# Patient Record
Sex: FEMALE | Race: WHITE | Employment: UNEMPLOYED | URBAN - METROPOLITAN AREA
[De-identification: names, ages, dates, MRNs, and addresses within clinical notes are randomized per-mention and may not be internally consistent; named-entity substitution may affect disease eponyms.]

---

## 2017-12-07 ENCOUNTER — OFFICE VISIT (OUTPATIENT)
Dept: FAMILY MEDICINE CLINIC | Facility: CLINIC | Age: 42
End: 2017-12-07

## 2017-12-07 DIAGNOSIS — Z23 NEED FOR VACCINATION: ICD-10-CM

## 2017-12-07 PROCEDURE — 90686 IIV4 VACC NO PRSV 0.5 ML IM: CPT | Performed by: NURSE PRACTITIONER

## 2017-12-07 PROCEDURE — 90471 IMMUNIZATION ADMIN: CPT | Performed by: NURSE PRACTITIONER

## 2017-12-27 PROBLEM — N80.9 ENDOMETRIOSIS: Status: ACTIVE | Noted: 2017-12-27

## 2017-12-27 PROBLEM — Z98.890 S/P ENDOMETRIAL ABLATION: Status: ACTIVE | Noted: 2017-12-27

## 2017-12-27 PROCEDURE — 87624 HPV HI-RISK TYP POOLED RSLT: CPT | Performed by: OBSTETRICS & GYNECOLOGY

## 2017-12-27 PROCEDURE — 88175 CYTOPATH C/V AUTO FLUID REDO: CPT | Performed by: OBSTETRICS & GYNECOLOGY

## 2018-02-13 ENCOUNTER — HOSPITAL ENCOUNTER (OUTPATIENT)
Dept: MAMMOGRAPHY | Age: 43
Discharge: HOME | End: 2018-02-13
Attending: OBSTETRICS & GYNECOLOGY
Payer: COMMERCIAL

## 2018-02-13 DIAGNOSIS — Z12.31 ENCOUNTER FOR SCREENING MAMMOGRAM FOR MALIGNANT NEOPLASM OF BREAST: ICD-10-CM

## 2018-02-13 PROCEDURE — 77067 SCR MAMMO BI INCL CAD: CPT | Performed by: OBSTETRICS & GYNECOLOGY

## 2018-02-13 PROCEDURE — 77063 BREAST TOMOSYNTHESIS BI: CPT | Performed by: OBSTETRICS & GYNECOLOGY

## 2018-02-23 ENCOUNTER — HOSPITAL ENCOUNTER (OUTPATIENT)
Dept: MAMMOGRAPHY | Age: 43
Discharge: HOME | End: 2018-02-23
Attending: OBSTETRICS & GYNECOLOGY
Payer: COMMERCIAL

## 2018-02-23 DIAGNOSIS — R92.2 INCONCLUSIVE MAMMOGRAM: ICD-10-CM

## 2018-02-23 PROCEDURE — 77065 DX MAMMO INCL CAD UNI: CPT | Performed by: OBSTETRICS & GYNECOLOGY

## 2018-02-23 NOTE — PROGRESS NOTES
Radiology has informed patient of results and recommendations for follow up. IF PATIENT HAD FILMS AT [facility], DO NOT NEED TO CONTACT: THEY HAVE SPOKEN TO HER.  IF DONE AT ANY OTHER FACILITY, PLEASE CONTACT TO BE SURE RECEIVED RESULTS AND F/U INSTRUCTIONS  Sent

## 2018-06-14 ENCOUNTER — HOSPITAL ENCOUNTER (OUTPATIENT)
Dept: GENERAL RADIOLOGY | Age: 43
Discharge: HOME | End: 2018-06-14
Attending: PHYSICIAN ASSISTANT
Payer: COMMERCIAL

## 2018-06-14 ENCOUNTER — OFFICE VISIT (OUTPATIENT)
Dept: FAMILY MEDICINE CLINIC | Facility: CLINIC | Age: 43
End: 2018-06-14

## 2018-06-14 VITALS
SYSTOLIC BLOOD PRESSURE: 114 MMHG | WEIGHT: 158 LBS | TEMPERATURE: 99 F | OXYGEN SATURATION: 98 % | BODY MASS INDEX: 29.83 KG/M2 | DIASTOLIC BLOOD PRESSURE: 80 MMHG | RESPIRATION RATE: 16 BRPM | HEIGHT: 61 IN | HEART RATE: 85 BPM

## 2018-06-14 DIAGNOSIS — M25.562 ACUTE PAIN OF LEFT KNEE: Primary | ICD-10-CM

## 2018-06-14 DIAGNOSIS — M25.562 ACUTE PAIN OF LEFT KNEE: ICD-10-CM

## 2018-06-14 PROCEDURE — 99204 OFFICE O/P NEW MOD 45 MIN: CPT | Performed by: PHYSICIAN ASSISTANT

## 2018-06-14 PROCEDURE — 73560 X-RAY EXAM OF KNEE 1 OR 2: CPT | Performed by: PHYSICIAN ASSISTANT

## 2018-06-14 NOTE — PROGRESS NOTES
Patient presents with:  Knee Pain: left knee, started 2 days ago    HPI:     Adan Leger is a 36year old female who presents today with a chief complaint of  left knee pain. She was playing with kids in the yard.  She was running and slipped on wet gras XR KNEE (1 OR 2 VIEWS), LEFT (CPT=73560); Future  -     Diclofenac Sodium 50 MG Oral Tab EC;  Take 1 tablet (50 mg total) by mouth 2 (two) times daily.  - Hinged patella knee brace   - Rest   - MRI if symptoms persist/based on xray

## 2018-06-15 ENCOUNTER — TELEPHONE (OUTPATIENT)
Dept: FAMILY MEDICINE CLINIC | Facility: CLINIC | Age: 43
End: 2018-06-15

## 2018-06-15 NOTE — TELEPHONE ENCOUNTER
----- Message from Natalya Hightower PA-C sent at 6/14/2018  5:00 PM CDT -----  Normal xray.  MRI ordered to further evaluate-ok to have patient schedule

## 2018-06-15 NOTE — TELEPHONE ENCOUNTER
PSR-please let patient know that her knee X-ray is normal so Maddie Coles has ordered an MRI for her. Central scheduling [phone]. Thanks.  left for patient to call back.

## 2018-07-16 ENCOUNTER — HOSPITAL ENCOUNTER (OUTPATIENT)
Dept: MRI IMAGING | Age: 43
Discharge: HOME | End: 2018-07-16
Attending: PHYSICIAN ASSISTANT
Payer: COMMERCIAL

## 2018-07-16 DIAGNOSIS — M25.562 ACUTE PAIN OF LEFT KNEE: ICD-10-CM

## 2018-07-16 PROCEDURE — 73721 MRI JNT OF LWR EXTRE W/O DYE: CPT | Performed by: PHYSICIAN ASSISTANT

## 2018-07-25 ENCOUNTER — TELEPHONE (OUTPATIENT)
Dept: FAMILY MEDICINE CLINIC | Facility: CLINIC | Age: 43
End: 2018-07-25

## 2018-07-25 NOTE — TELEPHONE ENCOUNTER
I called pt at [phone] and verified 2 patient identifiers: name & . I discussed results and recommendations at length with patient. All questions answered.

## 2018-07-25 NOTE — TELEPHONE ENCOUNTER
----- Message from Mariposa Conklin MD sent at 7/24/2018  1:17 PM CDT -----  No evidence to meniscal tear or ACL rupture    +ACL sprain  Pls advise rest elevation  Use crutches as needed  May continue with knee brace  Follow up in 3-4 weeks for recheck if

## 2018-10-16 ENCOUNTER — TELEPHONE (OUTPATIENT)
Dept: FAMILY MEDICINE CLINIC | Facility: CLINIC | Age: 43
End: 2018-10-16

## 2018-10-16 RX ORDER — VALACYCLOVIR HYDROCHLORIDE 1 G/1
2 TABLET, FILM COATED ORAL 2 TIMES DAILY
Qty: 4 TABLET | Refills: 0 | Status: SHIPPED | OUTPATIENT
Start: 2018-10-16 | End: 2018-10-17

## 2018-10-16 NOTE — TELEPHONE ENCOUNTER
Patient Radha Mirza called to request medication for coldsore. She has tried abreva and lysine and these are not clearing up the coldsore. States that she is unable to come in to office today as her daughter is having surgery today.   Requesting that script f

## 2018-11-02 ENCOUNTER — IMMUNIZATION (OUTPATIENT)
Dept: FAMILY MEDICINE CLINIC | Facility: CLINIC | Age: 43
End: 2018-11-02
Payer: COMMERCIAL

## 2018-11-02 DIAGNOSIS — Z23 NEED FOR VACCINATION: ICD-10-CM

## 2018-11-02 PROCEDURE — 90686 IIV4 VACC NO PRSV 0.5 ML IM: CPT | Performed by: PHYSICIAN ASSISTANT

## 2018-11-02 PROCEDURE — 90471 IMMUNIZATION ADMIN: CPT | Performed by: PHYSICIAN ASSISTANT

## 2018-11-05 ENCOUNTER — MED REC SCAN ONLY (OUTPATIENT)
Dept: FAMILY MEDICINE CLINIC | Facility: CLINIC | Age: 43
End: 2018-11-05

## 2019-05-30 ENCOUNTER — TELEPHONE (OUTPATIENT)
Dept: FAMILY MEDICINE CLINIC | Facility: CLINIC | Age: 44
End: 2019-05-30

## 2019-05-30 ENCOUNTER — OFFICE VISIT (OUTPATIENT)
Dept: FAMILY MEDICINE CLINIC | Facility: CLINIC | Age: 44
End: 2019-05-30
Payer: COMMERCIAL

## 2019-05-30 VITALS
DIASTOLIC BLOOD PRESSURE: 70 MMHG | OXYGEN SATURATION: 98 % | HEART RATE: 113 BPM | WEIGHT: 168 LBS | BODY MASS INDEX: 31.72 KG/M2 | RESPIRATION RATE: 16 BRPM | HEIGHT: 61 IN | SYSTOLIC BLOOD PRESSURE: 130 MMHG

## 2019-05-30 DIAGNOSIS — Z13.29 SCREENING FOR ENDOCRINE, NUTRITIONAL, METABOLIC AND IMMUNITY DISORDER: ICD-10-CM

## 2019-05-30 DIAGNOSIS — Z13.21 SCREENING FOR ENDOCRINE, NUTRITIONAL, METABOLIC AND IMMUNITY DISORDER: ICD-10-CM

## 2019-05-30 DIAGNOSIS — Z13.0 SCREENING FOR ENDOCRINE, NUTRITIONAL, METABOLIC AND IMMUNITY DISORDER: ICD-10-CM

## 2019-05-30 DIAGNOSIS — R39.9 UTI SYMPTOMS: Primary | ICD-10-CM

## 2019-05-30 DIAGNOSIS — Z13.228 SCREENING FOR ENDOCRINE, NUTRITIONAL, METABOLIC AND IMMUNITY DISORDER: ICD-10-CM

## 2019-05-30 DIAGNOSIS — Z12.39 SCREENING FOR MALIGNANT NEOPLASM OF BREAST: ICD-10-CM

## 2019-05-30 PROCEDURE — 87186 SC STD MICRODIL/AGAR DIL: CPT | Performed by: NURSE PRACTITIONER

## 2019-05-30 PROCEDURE — 87086 URINE CULTURE/COLONY COUNT: CPT | Performed by: NURSE PRACTITIONER

## 2019-05-30 PROCEDURE — 81003 URINALYSIS AUTO W/O SCOPE: CPT | Performed by: NURSE PRACTITIONER

## 2019-05-30 PROCEDURE — 87088 URINE BACTERIA CULTURE: CPT | Performed by: NURSE PRACTITIONER

## 2019-05-30 PROCEDURE — 99214 OFFICE O/P EST MOD 30 MIN: CPT | Performed by: NURSE PRACTITIONER

## 2019-05-30 RX ORDER — NITROFURANTOIN 25; 75 MG/1; MG/1
100 CAPSULE ORAL 2 TIMES DAILY
Qty: 10 CAPSULE | Refills: 0 | Status: SHIPPED | OUTPATIENT
Start: 2019-05-30 | End: 2019-06-04

## 2019-05-30 RX ORDER — PHENAZOPYRIDINE HYDROCHLORIDE 200 MG/1
200 TABLET, FILM COATED ORAL 3 TIMES DAILY PRN
Qty: 9 TABLET | Refills: 0 | Status: SHIPPED | OUTPATIENT
Start: 2019-05-30 | End: 2019-06-02

## 2019-05-30 NOTE — PATIENT INSTRUCTIONS
Urinary Tract Infections in Women    Urinary tract infections (UTIs) are most often caused by bacteria. These bacteria enter the urinary tract. The bacteria may come from outside the body.  Or they may travel from the skin outside the rectum or vagina int The lifestyle changes below will help get rid of your UTI. They may also help prevent future UTIs. · Drink plenty of fluids. This includes water, juice, or other caffeine-free drinks. Fluids help flush bacteria out of your body. · Empty your bladder.  Alw Talk to your pediatrician regarding the use of this medicine in children. While this drug may be prescribed for selected conditions, precautions do apply. What side effects may I notice from receiving this medicine?   Side effects that you should report to · an unusual or allergic reaction to nitrofurantoin, other antibiotics, other medicines, foods, dyes or preservatives  · pregnant or trying to get pregnant  · breast-feeding  What should I watch for while using this medicine?   Tell your doctor or health ca · allergic reactions like skin rash, itching or hives, swelling of the face, lips, or tongue  · blue or purple color of the skin  · difficulty breathing  · fever  · less urine  · unusual bleeding, bruising  · unusual tired, weak  · vomiting  · yellowing of NOTE:This sheet is a summary. It may not cover all possible information. If you have questions about this medicine, talk to your doctor, pharmacist, or health care provider.  Copyright© 2019 [employer]

## 2019-05-30 NOTE — TELEPHONE ENCOUNTER
Called and spoke with pt. Pt informed that she would need to be seen prior to receiving Rx. Pt states she will come in to [facility] for evaluation.

## 2019-05-30 NOTE — TELEPHONE ENCOUNTER
Patient has UTI symptoms, burning, pressure, frequency. I offered an appointment but she declined, she would like to check if Dr. Marisol Krishnamurthy would be willing to send a medication for her without an appointment (pharmacy on file).

## 2019-06-24 ENCOUNTER — OFFICE VISIT (OUTPATIENT)
Dept: FAMILY MEDICINE CLINIC | Facility: CLINIC | Age: 44
End: 2019-06-24
Payer: COMMERCIAL

## 2019-06-24 VITALS
TEMPERATURE: 98 F | DIASTOLIC BLOOD PRESSURE: 78 MMHG | OXYGEN SATURATION: 97 % | BODY MASS INDEX: 31.72 KG/M2 | HEART RATE: 90 BPM | SYSTOLIC BLOOD PRESSURE: 114 MMHG | HEIGHT: 61 IN | RESPIRATION RATE: 16 BRPM | WEIGHT: 168 LBS

## 2019-06-24 DIAGNOSIS — R39.9 UTI SYMPTOMS: Primary | ICD-10-CM

## 2019-06-24 PROCEDURE — 99213 OFFICE O/P EST LOW 20 MIN: CPT | Performed by: PHYSICIAN ASSISTANT

## 2019-06-24 PROCEDURE — 81003 URINALYSIS AUTO W/O SCOPE: CPT | Performed by: PHYSICIAN ASSISTANT

## 2019-06-24 PROCEDURE — 87186 SC STD MICRODIL/AGAR DIL: CPT | Performed by: PHYSICIAN ASSISTANT

## 2019-06-24 PROCEDURE — 87088 URINE BACTERIA CULTURE: CPT | Performed by: PHYSICIAN ASSISTANT

## 2019-06-24 PROCEDURE — 87086 URINE CULTURE/COLONY COUNT: CPT | Performed by: PHYSICIAN ASSISTANT

## 2019-06-24 RX ORDER — NITROFURANTOIN 25; 75 MG/1; MG/1
100 CAPSULE ORAL 2 TIMES DAILY
Qty: 14 CAPSULE | Refills: 0 | Status: SHIPPED | OUTPATIENT
Start: 2019-06-24 | End: 2019-07-01

## 2019-06-24 NOTE — PROGRESS NOTES
CHIEF COMPLAINT:   Patient presents with:  Burning On Urination: frequency, pressure x 3 days      HPI:   Elizabeth Ty is a 39year old female who presents with symptoms of UTI. Complaining of urinary frequency, urgency, dysuria for last 3 days.  Sympto LUNGS: clear to ausculation bilaterally, no wheezing or rhonchi  GI: BS present x 4. No hepatosplenomegaly. :  minimal suprapubic tenderness.  No bladder distention, or CVAT     Recent Results (from the past 24 hour(s))   URINALYSIS, AUTO, W/O SCOPE Normally, bacteria do not stay in the urine. But, when they do, the urine can become infected. This is called a urinary tract infection, or UTI. An infection can occur anywhere in the urinary tract, from the kidney to the bladder and urethra.  The most comm · Wiping improperly after urinating (always wipe from front to back)  · Bowel incontinence  · Pregnancy  · Procedures such as having a catheter inserted  · Older age  · Not emptying your bladder (stagnated urine gives bacteria a chance to grow)  · Peter Rizzo · Improve your diet and prevent constipation. Eat more fresh fruit and vegetables, more fiber, less junk and fatty foods. · Avoid sexual intercourse until your symptoms are gone. · Avoid caffeine, alcohol, and spicy foods. These can irritate thebladder.

## 2019-10-08 ENCOUNTER — LAB ENCOUNTER (OUTPATIENT)
Dept: LAB | Age: 44
End: 2019-10-08
Attending: NURSE PRACTITIONER
Payer: COMMERCIAL

## 2019-10-08 DIAGNOSIS — Z13.0 SCREENING FOR ENDOCRINE, NUTRITIONAL, METABOLIC AND IMMUNITY DISORDER: ICD-10-CM

## 2019-10-08 DIAGNOSIS — Z13.29 SCREENING FOR ENDOCRINE, NUTRITIONAL, METABOLIC AND IMMUNITY DISORDER: ICD-10-CM

## 2019-10-08 DIAGNOSIS — Z13.228 SCREENING FOR ENDOCRINE, NUTRITIONAL, METABOLIC AND IMMUNITY DISORDER: ICD-10-CM

## 2019-10-08 DIAGNOSIS — Z13.21 SCREENING FOR ENDOCRINE, NUTRITIONAL, METABOLIC AND IMMUNITY DISORDER: ICD-10-CM

## 2019-10-08 PROCEDURE — 80053 COMPREHEN METABOLIC PANEL: CPT

## 2019-10-08 PROCEDURE — 84443 ASSAY THYROID STIM HORMONE: CPT

## 2019-10-08 PROCEDURE — 85025 COMPLETE CBC W/AUTO DIFF WBC: CPT

## 2019-10-08 PROCEDURE — 82306 VITAMIN D 25 HYDROXY: CPT

## 2019-10-08 PROCEDURE — 80061 LIPID PANEL: CPT

## 2019-10-08 PROCEDURE — 36415 COLL VENOUS BLD VENIPUNCTURE: CPT

## 2019-10-09 ENCOUNTER — HOSPITAL ENCOUNTER (OUTPATIENT)
Dept: MAMMOGRAPHY | Age: 44
Discharge: HOME | End: 2019-10-09
Attending: NURSE PRACTITIONER
Payer: COMMERCIAL

## 2019-10-09 DIAGNOSIS — Z12.39 SCREENING FOR MALIGNANT NEOPLASM OF BREAST: ICD-10-CM

## 2019-10-09 PROCEDURE — 77063 BREAST TOMOSYNTHESIS BI: CPT | Performed by: NURSE PRACTITIONER

## 2019-10-09 PROCEDURE — 77067 SCR MAMMO BI INCL CAD: CPT | Performed by: NURSE PRACTITIONER

## 2019-10-18 ENCOUNTER — TELEPHONE (OUTPATIENT)
Dept: FAMILY MEDICINE CLINIC | Facility: CLINIC | Age: 44
End: 2019-10-18

## 2019-10-18 ENCOUNTER — IMMUNIZATION (OUTPATIENT)
Dept: FAMILY MEDICINE CLINIC | Facility: CLINIC | Age: 44
End: 2019-10-18
Payer: COMMERCIAL

## 2019-10-18 DIAGNOSIS — Z23 NEED FOR VACCINATION: ICD-10-CM

## 2019-10-18 PROCEDURE — 90686 IIV4 VACC NO PRSV 0.5 ML IM: CPT | Performed by: NURSE PRACTITIONER

## 2019-10-18 PROCEDURE — 90471 IMMUNIZATION ADMIN: CPT | Performed by: NURSE PRACTITIONER

## 2019-10-18 NOTE — TELEPHONE ENCOUNTER
Patient dropped off a Health Provided Screening From for completion. Please contact Pt when ready and she will pick it up. Copy of form made and filed behind the , original forwarded to PCP.

## 2019-10-21 NOTE — TELEPHONE ENCOUNTER
I called the PT and left a generic message. Pt did not have a physical with PCP, she needs to schedule in order to have the form completed.

## 2019-10-22 ENCOUNTER — OFFICE VISIT (OUTPATIENT)
Dept: FAMILY MEDICINE CLINIC | Facility: CLINIC | Age: 44
End: 2019-10-22
Payer: COMMERCIAL

## 2019-10-22 VITALS
RESPIRATION RATE: 18 BRPM | HEART RATE: 52 BPM | HEIGHT: 61.5 IN | WEIGHT: 169 LBS | SYSTOLIC BLOOD PRESSURE: 150 MMHG | OXYGEN SATURATION: 99 % | DIASTOLIC BLOOD PRESSURE: 90 MMHG | BODY MASS INDEX: 31.5 KG/M2 | TEMPERATURE: 99 F

## 2019-10-22 DIAGNOSIS — E55.9 VITAMIN D DEFICIENCY: ICD-10-CM

## 2019-10-22 DIAGNOSIS — Z23 NEED FOR TDAP VACCINATION: ICD-10-CM

## 2019-10-22 DIAGNOSIS — Z00.00 ENCOUNTER FOR ANNUAL PHYSICAL EXAMINATION EXCLUDING GYNECOLOGICAL EXAMINATION IN A PATIENT OLDER THAN 17 YEARS: Primary | ICD-10-CM

## 2019-10-22 DIAGNOSIS — R03.0 BLOOD PRESSURE ELEVATED WITHOUT HISTORY OF HTN: ICD-10-CM

## 2019-10-22 PROCEDURE — 90471 IMMUNIZATION ADMIN: CPT | Performed by: EMERGENCY MEDICINE

## 2019-10-22 PROCEDURE — 90715 TDAP VACCINE 7 YRS/> IM: CPT | Performed by: EMERGENCY MEDICINE

## 2019-10-22 PROCEDURE — 99396 PREV VISIT EST AGE 40-64: CPT | Performed by: EMERGENCY MEDICINE

## 2019-10-22 NOTE — TELEPHONE ENCOUNTER
Pts form has been completed and faxed to [phone]. Fax confirmation was received. Form was sent to scan.

## 2019-10-22 NOTE — PATIENT INSTRUCTIONS
Thank you for choosing [facility]  To Do:  FOR K[facility]IK E[facility]    · Follow up yearly or as needed  · Start high dose Vit D once a week for 12 weeks, Rx in pharmacy. After 12 weeks start over the counter Vit D 2000 for another 12 weeks.  Repeat Vit Collards   179 mg/1/2 cup   Swiss cheese   272 mg/1 oz.   White beans, cooked   81 mg/1/2 cup   English muffin, whole wheat   175 mg/1 muffin   Cheddar cheese   205 mg/1 oz.   Navy beans, cooked   79 mg/1/2 cup   Kale   90 mg/1/2 cup   Ice cream strawberry age 48, they should be done every 2 years. 3   Cervical cancer All women in this age group, except women who have had a complete hysterectomy Pap test every 3 years or Pap test plus human papilloma virus (HPV) test every 5 years   [facility]lam[facility]   Influenza (flu) All women in this age group Once a year   Measles, mumps, rubella (MMR) All women in this age group who have no record of these infections or vaccines 1 or 2 doses   Meningococcal Women at increased risk for infection – talk with yo

## 2019-10-22 NOTE — PROGRESS NOTES
Marquis Gomes is a 38year old female who presents for a complete physical exam.   HPI:     Patient presents with:  Physical: Annual physical         Age: 38    1First day of last menstrual period (or first year of         menstruation, if through menop seat belts? YES       d. If over 27years old, have you  had your cholesterol level checked  in the past five years? YES       e. Have you had a tetanus shot  the past 10 years? NO       f. Does your house have a working smoke detector?  YES        g. (hypo thyroid) Mother    • Breast Cancer Paternal Grandmother 36      Social History:   Social History    Tobacco Use      Smoking status: Never Smoker      Smokeless tobacco: Never Used    Alcohol use: Yes    Drug use: No           REVIEW OF SYSTEMS:   GE clubbing, or edema. MS: Normal muscles tones, no joints abnormalities. SKIN: Normal color, turgor, no lesions, rashes or wounds. PSYCH: normal affect and mood.       ASSESSMENT AND PLAN:               1. Encounter for annual physical examination excludin once a week for 12 weeks, Rx in pharmacy. After 12 weeks start over the counter Vit D 2000 for another 12 weeks. Repeat Vit D levels in 6 months  · PAP smears per OB  · Monitor BP daily and record  · Follow up in 2 weeks for BP recheck  · Low salt diet.

## 2019-10-23 PROBLEM — R03.0 BLOOD PRESSURE ELEVATED WITHOUT HISTORY OF HTN: Status: ACTIVE | Noted: 2019-10-23

## 2019-10-23 RX ORDER — ERGOCALCIFEROL 1.25 MG/1
50000 CAPSULE ORAL WEEKLY
Qty: 12 CAPSULE | Refills: 0 | Status: SHIPPED | OUTPATIENT
Start: 2019-10-23 | End: 2019-11-22

## 2019-10-25 ENCOUNTER — NURSE ONLY (OUTPATIENT)
Dept: FAMILY MEDICINE CLINIC | Facility: CLINIC | Age: 44
End: 2019-10-25
Payer: COMMERCIAL

## 2019-10-25 VITALS — SYSTOLIC BLOOD PRESSURE: 138 MMHG | DIASTOLIC BLOOD PRESSURE: 80 MMHG

## 2019-11-05 ENCOUNTER — NURSE ONLY (OUTPATIENT)
Dept: FAMILY MEDICINE CLINIC | Facility: CLINIC | Age: 44
End: 2019-11-05
Payer: COMMERCIAL

## 2019-11-05 VITALS — SYSTOLIC BLOOD PRESSURE: 136 MMHG | DIASTOLIC BLOOD PRESSURE: 78 MMHG

## 2020-01-07 ENCOUNTER — TELEPHONE (OUTPATIENT)
Dept: FAMILY MEDICINE CLINIC | Facility: CLINIC | Age: 45
End: 2020-01-07

## 2020-01-07 NOTE — TELEPHONE ENCOUNTER
New or ongoing issue: new  Brief description of symptoms:   Possible uti  Approximately how long? :   2 days  Offered appointment: ( YES    Appointment scheduled:  Yes today 1/7/20 @ 4pm                       Scheduled with - PCP     Scheduled appointment

## 2020-01-07 NOTE — TELEPHONE ENCOUNTER
Pt has UTI symptoms and is scheduled for appointment later today. Pt was last seen 10/22/19. Pt would like to know if something can be called in or if she should keep her appointment. Spoke with pt.  Pt informed to best treat her symptoms it is recommended

## 2020-01-10 ENCOUNTER — OFFICE VISIT (OUTPATIENT)
Dept: FAMILY MEDICINE CLINIC | Facility: CLINIC | Age: 45
End: 2020-01-10
Payer: COMMERCIAL

## 2020-01-10 VITALS
BODY MASS INDEX: 30.14 KG/M2 | SYSTOLIC BLOOD PRESSURE: 128 MMHG | DIASTOLIC BLOOD PRESSURE: 86 MMHG | WEIGHT: 163.81 LBS | HEIGHT: 62 IN | HEART RATE: 107 BPM | RESPIRATION RATE: 16 BRPM | TEMPERATURE: 99 F | OXYGEN SATURATION: 97 %

## 2020-01-10 DIAGNOSIS — N30.00 ACUTE CYSTITIS WITHOUT HEMATURIA: Primary | ICD-10-CM

## 2020-01-10 LAB
APPEARANCE: CLEAR
MULTISTIX LOT#: ABNORMAL NUMERIC
PH, URINE: 5 (ref 4.5–8)
SPECIFIC GRAVITY: 1.02 (ref 1–1.03)
URINE-COLOR: YELLOW
UROBILINOGEN,SEMI-QN: 0.2 MG/DL (ref 0–1.9)

## 2020-01-10 PROCEDURE — 87186 SC STD MICRODIL/AGAR DIL: CPT | Performed by: FAMILY MEDICINE

## 2020-01-10 PROCEDURE — 87086 URINE CULTURE/COLONY COUNT: CPT | Performed by: FAMILY MEDICINE

## 2020-01-10 PROCEDURE — 87088 URINE BACTERIA CULTURE: CPT | Performed by: FAMILY MEDICINE

## 2020-01-10 PROCEDURE — 81003 URINALYSIS AUTO W/O SCOPE: CPT | Performed by: FAMILY MEDICINE

## 2020-01-10 PROCEDURE — 99213 OFFICE O/P EST LOW 20 MIN: CPT | Performed by: FAMILY MEDICINE

## 2020-01-10 RX ORDER — CIPROFLOXACIN 500 MG/1
500 TABLET, FILM COATED ORAL 2 TIMES DAILY
Qty: 10 TABLET | Refills: 0 | Status: SHIPPED | OUTPATIENT
Start: 2020-01-10 | End: 2020-04-17

## 2020-01-10 NOTE — PROGRESS NOTES
Antoninodarrellduncan Kumar is a 38year old female. Patient presents with:  UTI: urgency,  burning when urinating, odor, x 5 days       HPI:   Patient presents with symptoms of UTI.  Complaining of urinary frequency, urgency, dysuria, suprapubic pain for last 5 days

## 2020-01-18 DIAGNOSIS — E55.9 VITAMIN D DEFICIENCY: ICD-10-CM

## 2020-01-19 RX ORDER — ERGOCALCIFEROL 1.25 MG/1
CAPSULE ORAL
Qty: 12 CAPSULE | Refills: 0 | OUTPATIENT
Start: 2020-01-19

## 2020-04-17 ENCOUNTER — VIRTUAL PHONE E/M (OUTPATIENT)
Dept: FAMILY MEDICINE CLINIC | Facility: CLINIC | Age: 45
End: 2020-04-17
Payer: COMMERCIAL

## 2020-04-17 DIAGNOSIS — N30.00 ACUTE CYSTITIS WITHOUT HEMATURIA: Primary | ICD-10-CM

## 2020-04-17 PROCEDURE — 99213 OFFICE O/P EST LOW 20 MIN: CPT | Performed by: EMERGENCY MEDICINE

## 2020-04-17 RX ORDER — CIPROFLOXACIN 500 MG/1
500 TABLET, FILM COATED ORAL 2 TIMES DAILY
Qty: 10 TABLET | Refills: 0 | Status: SHIPPED | OUTPATIENT
Start: 2020-04-17 | End: 2020-09-16

## 2020-04-17 NOTE — PROGRESS NOTES
Virtual Telephone Check-In        Herlinda Rhoades verbally consents to a Virtual/Telephone Check-In visit on 04/17/20.         Patient understands and accepts financial responsibility for any deductible, co-insurance and/or co-pays associated with this s

## 2020-10-06 ENCOUNTER — IMMUNIZATION (OUTPATIENT)
Dept: FAMILY MEDICINE CLINIC | Facility: CLINIC | Age: 45
End: 2020-10-06
Payer: COMMERCIAL

## 2020-10-06 PROCEDURE — 90471 IMMUNIZATION ADMIN: CPT | Performed by: NURSE PRACTITIONER

## 2020-10-06 PROCEDURE — 90686 IIV4 VACC NO PRSV 0.5 ML IM: CPT | Performed by: NURSE PRACTITIONER

## 2020-10-11 ENCOUNTER — HOSPITAL ENCOUNTER (OUTPATIENT)
Dept: MAMMOGRAPHY | Age: 45
Discharge: HOME | End: 2020-10-11
Attending: OBSTETRICS & GYNECOLOGY
Payer: COMMERCIAL

## 2020-10-11 DIAGNOSIS — Z80.3 FAMILY HISTORY OF BREAST CANCER: ICD-10-CM

## 2020-10-11 DIAGNOSIS — Z12.31 OTHER SCREENING MAMMOGRAM: ICD-10-CM

## 2020-10-11 PROCEDURE — 77067 SCR MAMMO BI INCL CAD: CPT | Performed by: OBSTETRICS & GYNECOLOGY

## 2020-10-11 PROCEDURE — 77063 BREAST TOMOSYNTHESIS BI: CPT | Performed by: OBSTETRICS & GYNECOLOGY

## 2020-12-20 ENCOUNTER — OFFICE VISIT (OUTPATIENT)
Dept: FAMILY MEDICINE CLINIC | Facility: CLINIC | Age: 45
End: 2020-12-20
Payer: COMMERCIAL

## 2020-12-20 VITALS
HEART RATE: 105 BPM | HEIGHT: 62 IN | WEIGHT: 166.81 LBS | DIASTOLIC BLOOD PRESSURE: 82 MMHG | BODY MASS INDEX: 30.69 KG/M2 | TEMPERATURE: 100 F | SYSTOLIC BLOOD PRESSURE: 136 MMHG | RESPIRATION RATE: 20 BRPM | OXYGEN SATURATION: 98 %
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
## 2020-12-20 DIAGNOSIS — S61.258A DOG BITE OF INDEX FINGER, INITIAL ENCOUNTER: Primary | ICD-10-CM

## 2020-12-20 PROCEDURE — 99213 OFFICE O/P EST LOW 20 MIN: CPT | Performed by: NURSE PRACTITIONER

## 2020-12-20 PROCEDURE — 3079F DIAST BP 80-89 MM HG: CPT | Performed by: NURSE PRACTITIONER

## 2020-12-20 PROCEDURE — 3075F SYST BP GE 130 - 139MM HG: CPT | Performed by: NURSE PRACTITIONER

## 2020-12-20 PROCEDURE — 3008F BODY MASS INDEX DOCD: CPT | Performed by: NURSE PRACTITIONER

## 2020-12-20 RX ORDER — AMOXICILLIN AND CLAVULANATE POTASSIUM 875; 125 MG/1; MG/1
1 TABLET, FILM COATED ORAL 2 TIMES DAILY
Qty: 20 TABLET | Refills: 0 | Status: SHIPPED | OUTPATIENT
Start: 2020-12-20 | End: 2020-12-30

## 2020-12-20 NOTE — PROGRESS NOTES
[facility]   CHIEF COMPLAINT:   Patient presents with:  Animal Bite: dog bite right index finger hour ago      HPI:   Patient's presenting with animal bite:  Animal: dog   Owner of animal:  yes  Location: right index finger  Numbness: no  Any associated injuries: no deep structures in tact: Yes  Sensations intact: yes  NEURO: normal sensation to area.        ASSESSMENT AND PLAN:     Assessment:  Dog bite of index finger, initial encounter  (primary encounter diagnosis)    Plan:   Discussed possible need for x-ray, but days. But an infection can occur even with correct treatment. So be sure to check the wound daily for signs of infection (see below). · Antibiotics may be prescribed. These help prevent or treat infection.  If you’re given antibiotics, take them as directe of infection:  ? Spreading redness or warmth from the wound  ? Increased pain or swelling  ? Fever of 100.4ºF (38ºC) or higher, or as directed by your healthcare provider  ? Colored fluid or pus draining from the wound  · Signs of rabies infection.  Don't w

## 2020-12-20 NOTE — PATIENT INSTRUCTIONS
Dog Bite  A dog bite can cause a wound deep enough to break the skin. In such cases, the wound is cleaned and sometimes closed.  Wounds would be closed if they are gaping open or in cosmetically important areas such as the face. If the wound is closed, it If the dog becomes ill or dies during that time, contact your local animal control center at once so the animal may be tested for rabies.  If the dog stays healthy for the next 10 days, there is no danger of rabies in the animal or you.  ? If a stray dog bi professional's instructions.

## 2021-08-09 ENCOUNTER — TELEPHONE (OUTPATIENT)
Dept: FAMILY MEDICINE CLINIC | Facility: CLINIC | Age: 46
End: 2021-08-09

## 2021-08-09 NOTE — TELEPHONE ENCOUNTER
Patient is needing her vaccination record. Record printed and left for Pt with the [employer].

## 2022-01-06 ENCOUNTER — TELEMEDICINE (OUTPATIENT)
Dept: FAMILY MEDICINE CLINIC | Facility: CLINIC | Age: 47
End: 2022-01-06
Payer: COMMERCIAL

## 2022-01-06 DIAGNOSIS — U07.1 COVID-19 VIRUS INFECTION: Primary | ICD-10-CM

## 2022-01-06 PROCEDURE — 99213 OFFICE O/P EST LOW 20 MIN: CPT | Performed by: FAMILY MEDICINE

## 2022-01-06 NOTE — PROGRESS NOTES
Virtual Video Check-In    Sebastian Cao verbally consents to a Virtual/Video Check-In visit on 01/06/22. Patient has been referred to the [facility] website at [email or web address] to review the yearly Consent to Treat document.     Patient understands and

## 2022-02-19 ENCOUNTER — HOSPITAL ENCOUNTER (OUTPATIENT)
Dept: MAMMOGRAPHY | Age: 47
Discharge: HOME | End: 2022-02-19
Attending: OBSTETRICS & GYNECOLOGY
Payer: COMMERCIAL

## 2022-02-19 DIAGNOSIS — Z12.31 ENCOUNTER FOR SCREENING MAMMOGRAM FOR MALIGNANT NEOPLASM OF BREAST: ICD-10-CM

## 2022-02-19 PROCEDURE — 77063 BREAST TOMOSYNTHESIS BI: CPT | Performed by: OBSTETRICS & GYNECOLOGY

## 2022-02-19 PROCEDURE — 77067 SCR MAMMO BI INCL CAD: CPT | Performed by: OBSTETRICS & GYNECOLOGY

## 2022-07-20 ENCOUNTER — OFFICE VISIT (OUTPATIENT)
Dept: FAMILY MEDICINE CLINIC | Facility: CLINIC | Age: 47
End: 2022-07-20
Payer: COMMERCIAL

## 2022-07-20 VITALS
HEART RATE: 120 BPM | BODY MASS INDEX: 30.36 KG/M2 | RESPIRATION RATE: 20 BRPM | WEIGHT: 165 LBS | DIASTOLIC BLOOD PRESSURE: 90 MMHG | OXYGEN SATURATION: 99 % | SYSTOLIC BLOOD PRESSURE: 130 MMHG | HEIGHT: 62 IN | TEMPERATURE: 98 F

## 2022-07-20 DIAGNOSIS — J02.9 SORE THROAT: ICD-10-CM

## 2022-07-20 DIAGNOSIS — J06.9 URI WITH COUGH AND CONGESTION: Primary | ICD-10-CM

## 2022-07-20 LAB
CONTROL LINE PRESENT WITH A CLEAR BACKGROUND (YES/NO): YES YES/NO
KIT LOT #: NORMAL NUMERIC
STREP GRP A CUL-SCR: NEGATIVE

## 2022-07-20 PROCEDURE — 3008F BODY MASS INDEX DOCD: CPT | Performed by: NURSE PRACTITIONER

## 2022-07-20 PROCEDURE — 87880 STREP A ASSAY W/OPTIC: CPT | Performed by: NURSE PRACTITIONER

## 2022-07-20 PROCEDURE — 3080F DIAST BP >= 90 MM HG: CPT | Performed by: NURSE PRACTITIONER

## 2022-07-20 PROCEDURE — 99213 OFFICE O/P EST LOW 20 MIN: CPT | Performed by: NURSE PRACTITIONER

## 2022-07-20 PROCEDURE — 3075F SYST BP GE 130 - 139MM HG: CPT | Performed by: NURSE PRACTITIONER

## 2022-07-20 RX ORDER — LIDOCAINE HYDROCHLORIDE 20 MG/ML
5 SOLUTION OROPHARYNGEAL
Qty: 100 ML | Refills: 0 | Status: SHIPPED | OUTPATIENT
Start: 2022-07-20 | End: 2022-07-23

## 2022-07-20 RX ORDER — BENZONATATE 200 MG/1
200 CAPSULE ORAL 3 TIMES DAILY PRN
Qty: 20 CAPSULE | Refills: 0 | Status: SHIPPED | OUTPATIENT
Start: 2022-07-20 | End: 2022-07-27

## 2022-07-21 LAB — SARS-COV-2 RNA RESP QL NAA+PROBE: NOT DETECTED

## 2022-07-22 ENCOUNTER — TELEPHONE (OUTPATIENT)
Dept: FAMILY MEDICINE CLINIC | Facility: CLINIC | Age: 47
End: 2022-07-22

## 2022-07-22 NOTE — TELEPHONE ENCOUNTER
Spoke to patient, she says that she has begun coughing up brown phlegm and has started running a fever She does not complain of SOB. Explained that we have no appointment and recommend she go to Urgent Care where they could do an xray and prescribe possible antibiotics. Patient has agreed to go.

## 2022-07-22 NOTE — TELEPHONE ENCOUNTER
Patient was seen at [facility] on 7/20/22, since then Pt developed a fever of 101.3, she is coughing up a brown mucus. Patient is looking for an advise.

## 2022-08-03 ENCOUNTER — OFFICE VISIT (OUTPATIENT)
Dept: FAMILY MEDICINE CLINIC | Facility: CLINIC | Age: 47
End: 2022-08-03
Payer: COMMERCIAL

## 2022-08-03 VITALS
WEIGHT: 173 LBS | RESPIRATION RATE: 16 BRPM | SYSTOLIC BLOOD PRESSURE: 124 MMHG | DIASTOLIC BLOOD PRESSURE: 82 MMHG | HEIGHT: 62 IN | HEART RATE: 95 BPM | BODY MASS INDEX: 31.83 KG/M2 | OXYGEN SATURATION: 98 %

## 2022-08-03 DIAGNOSIS — R05.1 ACUTE COUGH: Primary | ICD-10-CM

## 2022-08-03 PROCEDURE — 3008F BODY MASS INDEX DOCD: CPT | Performed by: FAMILY MEDICINE

## 2022-08-03 PROCEDURE — 3079F DIAST BP 80-89 MM HG: CPT | Performed by: FAMILY MEDICINE

## 2022-08-03 PROCEDURE — 99213 OFFICE O/P EST LOW 20 MIN: CPT | Performed by: FAMILY MEDICINE

## 2022-08-03 PROCEDURE — 3074F SYST BP LT 130 MM HG: CPT | Performed by: FAMILY MEDICINE

## 2022-12-12 ENCOUNTER — OFFICE VISIT (OUTPATIENT)
Dept: FAMILY MEDICINE CLINIC | Facility: CLINIC | Age: 47
End: 2022-12-12
Payer: COMMERCIAL

## 2022-12-12 VITALS
BODY MASS INDEX: 30.36 KG/M2 | WEIGHT: 165 LBS | HEART RATE: 125 BPM | DIASTOLIC BLOOD PRESSURE: 77 MMHG | SYSTOLIC BLOOD PRESSURE: 141 MMHG | HEIGHT: 62 IN | RESPIRATION RATE: 16 BRPM | TEMPERATURE: 98 F | OXYGEN SATURATION: 96 %

## 2022-12-12 DIAGNOSIS — J11.1 INFLUENZA-LIKE ILLNESS: ICD-10-CM

## 2022-12-12 DIAGNOSIS — Z20.828 EXPOSURE TO THE FLU: ICD-10-CM

## 2022-12-12 DIAGNOSIS — J02.9 SORE THROAT: Primary | ICD-10-CM

## 2022-12-12 PROCEDURE — 87637 SARSCOV2&INF A&B&RSV AMP PRB: CPT | Performed by: NURSE PRACTITIONER

## 2022-12-13 LAB
FLUAV + FLUBV RNA SPEC NAA+PROBE: NOT DETECTED
FLUAV + FLUBV RNA SPEC NAA+PROBE: NOT DETECTED
RSV RNA SPEC NAA+PROBE: NOT DETECTED
SARS-COV-2 RNA RESP QL NAA+PROBE: NOT DETECTED

## 2023-01-16 ENCOUNTER — OFFICE VISIT (OUTPATIENT)
Dept: FAMILY MEDICINE CLINIC | Facility: CLINIC | Age: 48
End: 2023-01-16
Payer: COMMERCIAL

## 2023-01-16 VITALS
OXYGEN SATURATION: 98 % | BODY MASS INDEX: 33.49 KG/M2 | HEIGHT: 62 IN | RESPIRATION RATE: 15 BRPM | SYSTOLIC BLOOD PRESSURE: 155 MMHG | WEIGHT: 182 LBS | HEART RATE: 101 BPM | DIASTOLIC BLOOD PRESSURE: 82 MMHG

## 2023-01-16 DIAGNOSIS — Z00.00 LABORATORY EXAMINATION ORDERED AS PART OF A ROUTINE GENERAL MEDICAL EXAMINATION: ICD-10-CM

## 2023-01-16 DIAGNOSIS — R03.0 BLOOD PRESSURE ELEVATED WITHOUT HISTORY OF HTN: ICD-10-CM

## 2023-01-16 DIAGNOSIS — Z12.11 SCREENING FOR COLON CANCER: ICD-10-CM

## 2023-01-16 DIAGNOSIS — Z12.31 ENCOUNTER FOR SCREENING MAMMOGRAM FOR MALIGNANT NEOPLASM OF BREAST: ICD-10-CM

## 2023-01-16 DIAGNOSIS — Z00.00 ENCOUNTER FOR ANNUAL PHYSICAL EXAMINATION EXCLUDING GYNECOLOGICAL EXAMINATION IN A PATIENT OLDER THAN 17 YEARS: Primary | ICD-10-CM

## 2023-01-16 PROCEDURE — 99396 PREV VISIT EST AGE 40-64: CPT | Performed by: EMERGENCY MEDICINE

## 2023-01-16 PROCEDURE — 3079F DIAST BP 80-89 MM HG: CPT | Performed by: EMERGENCY MEDICINE

## 2023-01-16 PROCEDURE — 3077F SYST BP >= 140 MM HG: CPT | Performed by: EMERGENCY MEDICINE

## 2023-01-16 PROCEDURE — 3008F BODY MASS INDEX DOCD: CPT | Performed by: EMERGENCY MEDICINE

## 2023-01-19 ENCOUNTER — PATIENT MESSAGE (OUTPATIENT)
Dept: FAMILY MEDICINE CLINIC | Facility: CLINIC | Age: 48
End: 2023-01-19

## 2023-01-19 NOTE — TELEPHONE ENCOUNTER
From: Kings Lowry  To: Genaro Rondon MD  Sent: 1/19/2023 6:56 AM CST  Subject: Colonoscopy visit    I was wondering if I could schedule my colonoscopy with Dr. Martina Ibarra and if I could do that over my chart or do I need to call their office? Thank you.

## 2023-02-11 ENCOUNTER — LAB ENCOUNTER (OUTPATIENT)
Dept: LAB | Age: 48
End: 2023-02-11
Attending: EMERGENCY MEDICINE
Payer: COMMERCIAL

## 2023-02-11 DIAGNOSIS — Z00.00 LABORATORY EXAMINATION ORDERED AS PART OF A ROUTINE GENERAL MEDICAL EXAMINATION: ICD-10-CM

## 2023-02-11 LAB
ALBUMIN SERPL-MCNC: 3.4 G/DL (ref 3.4–5)
ALBUMIN/GLOB SERPL: 0.9 {RATIO} (ref 1–2)
ALP LIVER SERPL-CCNC: 85 U/L
ALT SERPL-CCNC: 31 U/L
ANION GAP SERPL CALC-SCNC: 10 MMOL/L (ref 0–18)
AST SERPL-CCNC: 23 U/L (ref 15–37)
BASOPHILS # BLD AUTO: 0.09 X10(3) UL (ref 0–0.2)
BASOPHILS NFR BLD AUTO: 0.8 %
BILIRUB SERPL-MCNC: 0.3 MG/DL (ref 0.1–2)
BUN BLD-MCNC: 14 MG/DL (ref 7–18)
CALCIUM BLD-MCNC: 9.5 MG/DL (ref 8.5–10.1)
CHLORIDE SERPL-SCNC: 107 MMOL/L (ref 98–112)
CHOLEST SERPL-MCNC: 174 MG/DL (ref ?–200)
CO2 SERPL-SCNC: 22 MMOL/L (ref 21–32)
CREAT BLD-MCNC: 0.88 MG/DL
EOSINOPHIL # BLD AUTO: 0.1 X10(3) UL (ref 0–0.7)
EOSINOPHIL NFR BLD AUTO: 0.9 %
ERYTHROCYTE [DISTWIDTH] IN BLOOD BY AUTOMATED COUNT: 13.4 %
FASTING PATIENT LIPID ANSWER: YES
FASTING STATUS PATIENT QL REPORTED: YES
GFR SERPLBLD BASED ON 1.73 SQ M-ARVRAT: 81 ML/MIN/1.73M2 (ref 60–?)
GLOBULIN PLAS-MCNC: 3.9 G/DL (ref 2.8–4.4)
GLUCOSE BLD-MCNC: 94 MG/DL (ref 70–99)
HCT VFR BLD AUTO: 45.5 %
HDLC SERPL-MCNC: 41 MG/DL (ref 40–59)
HGB BLD-MCNC: 15.3 G/DL
IMM GRANULOCYTES # BLD AUTO: 0.04 X10(3) UL (ref 0–1)
IMM GRANULOCYTES NFR BLD: 0.3 %
LDLC SERPL CALC-MCNC: 101 MG/DL (ref ?–100)
LYMPHOCYTES # BLD AUTO: 2.97 X10(3) UL (ref 1–4)
LYMPHOCYTES NFR BLD AUTO: 25.7 %
MCH RBC QN AUTO: 28.6 PG (ref 26–34)
MCHC RBC AUTO-ENTMCNC: 33.6 G/DL (ref 31–37)
MCV RBC AUTO: 85 FL
MONOCYTES # BLD AUTO: 0.67 X10(3) UL (ref 0.1–1)
MONOCYTES NFR BLD AUTO: 5.8 %
NEUTROPHILS # BLD AUTO: 7.7 X10 (3) UL (ref 1.5–7.7)
NEUTROPHILS # BLD AUTO: 7.7 X10(3) UL (ref 1.5–7.7)
NEUTROPHILS NFR BLD AUTO: 66.5 %
NONHDLC SERPL-MCNC: 133 MG/DL (ref ?–130)
OSMOLALITY SERPL CALC.SUM OF ELEC: 288 MOSM/KG (ref 275–295)
PLATELET # BLD AUTO: 323 10(3)UL (ref 150–450)
POTASSIUM SERPL-SCNC: 4.1 MMOL/L (ref 3.5–5.1)
PROT SERPL-MCNC: 7.3 G/DL (ref 6.4–8.2)
RBC # BLD AUTO: 5.35 X10(6)UL
SODIUM SERPL-SCNC: 139 MMOL/L (ref 136–145)
TRIGL SERPL-MCNC: 187 MG/DL (ref 30–149)
TSI SER-ACNC: 1.6 MIU/ML (ref 0.36–3.74)
VLDLC SERPL CALC-MCNC: 31 MG/DL (ref 0–30)
WBC # BLD AUTO: 11.6 X10(3) UL (ref 4–11)

## 2023-02-11 PROCEDURE — 80050 GENERAL HEALTH PANEL: CPT | Performed by: EMERGENCY MEDICINE

## 2023-02-11 PROCEDURE — 80061 LIPID PANEL: CPT | Performed by: EMERGENCY MEDICINE

## 2023-03-04 ENCOUNTER — OFFICE VISIT (OUTPATIENT)
Dept: FAMILY MEDICINE CLINIC | Facility: CLINIC | Age: 48
End: 2023-03-04
Payer: COMMERCIAL

## 2023-03-04 VITALS
RESPIRATION RATE: 22 BRPM | DIASTOLIC BLOOD PRESSURE: 88 MMHG | BODY MASS INDEX: 32.71 KG/M2 | OXYGEN SATURATION: 99 % | HEIGHT: 62 IN | WEIGHT: 177.75 LBS | HEART RATE: 110 BPM | SYSTOLIC BLOOD PRESSURE: 138 MMHG

## 2023-03-04 DIAGNOSIS — R03.0 BLOOD PRESSURE ELEVATED WITHOUT HISTORY OF HTN: Primary | ICD-10-CM

## 2023-03-04 DIAGNOSIS — F41.9 ANXIETY DISORDER, UNSPECIFIED TYPE: ICD-10-CM

## 2023-03-04 PROCEDURE — 3079F DIAST BP 80-89 MM HG: CPT | Performed by: EMERGENCY MEDICINE

## 2023-03-04 PROCEDURE — 99213 OFFICE O/P EST LOW 20 MIN: CPT | Performed by: EMERGENCY MEDICINE

## 2023-03-04 PROCEDURE — 3008F BODY MASS INDEX DOCD: CPT | Performed by: EMERGENCY MEDICINE

## 2023-03-04 PROCEDURE — 3075F SYST BP GE 130 - 139MM HG: CPT | Performed by: EMERGENCY MEDICINE

## 2023-03-04 NOTE — PATIENT INSTRUCTIONS
Thank you for choosing [facility]  To Do:  FOR KARTIK EAST    Relaxation techniques, yoga ruddy chi also help, recommend guided meditation with CALM or HEADSPACE neftali  Follow up yearly or as needed  Consider treatment of anxiety  Continue to monitor BP sporadically or as needed

## 2023-03-07 PROBLEM — D12.3 BENIGN NEOPLASM OF TRANSVERSE COLON: Status: ACTIVE | Noted: 2023-03-07

## 2023-03-07 PROBLEM — Z12.11 SPECIAL SCREENING FOR MALIGNANT NEOPLASM OF COLON: Status: ACTIVE | Noted: 2023-03-07

## 2023-07-16 ENCOUNTER — HOSPITAL ENCOUNTER (OUTPATIENT)
Age: 48
Discharge: HOME | End: 2023-07-16
Attending: EMERGENCY MEDICINE
Payer: COMMERCIAL

## 2023-07-16 VITALS
BODY MASS INDEX: 27.6 KG/M2 | OXYGEN SATURATION: 99 % | WEIGHT: 150 LBS | DIASTOLIC BLOOD PRESSURE: 79 MMHG | SYSTOLIC BLOOD PRESSURE: 157 MMHG | HEIGHT: 62 IN | RESPIRATION RATE: 16 BRPM | TEMPERATURE: 97 F | HEART RATE: 94 BPM

## 2023-07-16 DIAGNOSIS — R42 VERTIGO: Primary | ICD-10-CM

## 2023-07-16 PROCEDURE — 99204 OFFICE O/P NEW MOD 45 MIN: CPT

## 2023-07-16 PROCEDURE — S0119 ONDANSETRON 4 MG: HCPCS

## 2023-07-16 PROCEDURE — 99213 OFFICE O/P EST LOW 20 MIN: CPT

## 2023-07-16 RX ORDER — MECLIZINE HYDROCHLORIDE 25 MG/1
25 TABLET ORAL
Qty: 20 TABLET | Refills: 0 | Status: SHIPPED | OUTPATIENT
Start: 2023-07-16

## 2023-07-16 RX ORDER — ONDANSETRON 4 MG/1
4 TABLET, ORALLY DISINTEGRATING ORAL ONCE
Status: COMPLETED | OUTPATIENT
Start: 2023-07-16 | End: 2023-07-16

## 2023-07-16 RX ORDER — ONDANSETRON 4 MG/1
4 TABLET, ORALLY DISINTEGRATING ORAL EVERY 4 HOURS PRN
Qty: 10 TABLET | Refills: 0 | Status: SHIPPED | OUTPATIENT
Start: 2023-07-16 | End: 2023-07-23

## 2023-07-16 NOTE — DISCHARGE INSTRUCTIONS
Follow-up with your primary care doctor as needed  Take meclizine 25 mg every 6 hours as needed for dizziness  Take Zofran as needed for nausea every 4 hours  Return if any worsening symptoms or new concerns

## 2023-07-16 NOTE — ED INITIAL ASSESSMENT (HPI)
Patient presents to IC with c/o dizziness that started yesterday with nausea. She states it felt like the room was spinning and could not turn her head to side without symptoms. States she felt drunk. No h/o vertigo.

## 2023-11-09 ENCOUNTER — MED REC SCAN ONLY (OUTPATIENT)
Dept: FAMILY MEDICINE CLINIC | Facility: CLINIC | Age: 48
End: 2023-11-09

## 2023-12-02 ENCOUNTER — PATIENT MESSAGE (OUTPATIENT)
Dept: FAMILY MEDICINE CLINIC | Facility: CLINIC | Age: 48
End: 2023-12-02

## 2024-03-07 ENCOUNTER — OFFICE VISIT (OUTPATIENT)
Dept: SURGERY | Facility: CLINIC | Age: 49
End: 2024-03-07

## 2024-03-07 DIAGNOSIS — R31.29 MICROHEMATURIA: Primary | ICD-10-CM

## 2024-03-07 DIAGNOSIS — R82.90 URINE FINDING: ICD-10-CM

## 2024-03-07 LAB
APPEARANCE: CLEAR
BILIRUBIN: NEGATIVE
GLUCOSE (URINE DIPSTICK): NEGATIVE MG/DL
KETONES (URINE DIPSTICK): NEGATIVE MG/DL
MULTISTIX LOT#: ABNORMAL NUMERIC
NITRITE, URINE: NEGATIVE
PH, URINE: 6 (ref 4.5–8)
SPECIFIC GRAVITY: 1.02 (ref 1–1.03)
URINE-COLOR: YELLOW
UROBILINOGEN,SEMI-QN: 0.2 MG/DL (ref 0–1.9)

## 2024-03-07 PROCEDURE — 81003 URINALYSIS AUTO W/O SCOPE: CPT

## 2024-03-07 PROCEDURE — 99203 OFFICE O/P NEW LOW 30 MIN: CPT

## 2024-03-07 NOTE — PROGRESS NOTES
[facility]    Urology Consult Note    History of Present Illness:   Patient is a(n) 48 year old female with hx of endometriosis and anxiety who presents for microhematuria consult.    Pt had UTI on 24. Had sx of dysuria, frequency, urgency. No gross hematuria. Culture >100k klebsiella cefazolin R, macrobid I. She was tx with abx.     Repeat microscopic on 24 had 5-9RBC and generally dirty but thought to be 2/2 pt beginning period. Culture done at this time was also neg. Repeat cath sample completed on 24 with 3-4RBC and 6-9WBC.    Currently, she has no urinary complaints. Feels UTI completely resolved. No fhx of  cx. No smoking hx. Gets UTIs infrequently. Works as wound care RN.     UA today trace intact blood, moderate leuks.     HISTORY:  Past Medical History:   Diagnosis Date    Body piercing     Ears    Endometriosis     Hemorrhoids     After pregnancy    Wears glasses     Readers      Past Surgical History:   Procedure Laterality Date      ,     ENDOMETRIAL ABLATION  2006    TONSILLECTOMY  1980      Family History   Problem Relation Age of Onset    Other (hypo thyroid) Mother     Breast Cancer Paternal Grandmother 40      Social History:   Social History     Socioeconomic History    Marital status:    Tobacco Use    Smoking status: Never    Smokeless tobacco: Never   Vaping Use    Vaping Use: Never used   Substance and Sexual Activity    Alcohol use: Not Currently    Drug use: No    Sexual activity: Yes     Partners: Male     Birth control/protection: Pill, Vasectomy        Allergies  No Known Allergies    Review of Systems:   A 10-point review of systems was completed and is negative other than as noted above.    Physical Exam:   LMP 2023 (Approximate)     GENERAL APPEARANCE: no acute distress  NEUROLOGIC: converses appropriately  HEAD: atraumatic, normocephalic  LUNGS: non-labored breathing  ABDOMEN: soft,  nontender, non-distended  BACK: no CVA tenderness  PSYCH: appropriate affect and mood    Results:     Laboratory Data:  Lab Results   Component Value Date    WBC 11.6 (H) 02/11/2023    HGB 15.3 02/11/2023    .0 02/11/2023     Lab Results   Component Value Date     02/11/2023    K 4.1 02/11/2023     02/11/2023    CO2 22.0 02/11/2023    BUN 14 02/11/2023    GLU 94 02/11/2023    GFRAA 88 10/08/2019    AST 23 02/11/2023    ALT 31 02/11/2023    TP 7.3 02/11/2023    ALB 3.4 02/11/2023    CA 9.5 02/11/2023       Urinalysis Results (last 3 years):  Recent Labs     03/07/24  1004   SPECGRAVITY 1.025   PHURINE 6.0   NITRITE Negative       Urine Culture Results (last 3 years):  Lab Results   Component Value Date    URINECUL 10,000 - 50,000 CFU/ML Escherichia coli (A) 01/10/2020    URINECUL 50,000-99,000 CFU/ML Escherichia coli (A) 06/24/2019    URINECUL >100,000 CFU/ML Escherichia coli (A) 05/30/2019       Imaging  No results found.      Impression:   Recommendations:  Microhematuria  - microscopic today and if culture is neg, will need to proceed with CTU and cysto if blood is persistent  - if culture is pos, will tx and repeat microanalysis in a couple wks     Thank you very much for this consult. Please call if there are any questions or concerns.     Cindy Dow PA-C  Urology  [facility]  Phone: [phone]    Date: 3/7/2024  Time: 11:47 AM   negative

## 2024-03-08 LAB — NON GYNE INTERPRETATION: NEGATIVE

## 2024-03-12 ENCOUNTER — PATIENT MESSAGE (OUTPATIENT)
Dept: SURGERY | Facility: CLINIC | Age: 49
End: 2024-03-12

## 2024-03-12 ENCOUNTER — TELEPHONE (OUTPATIENT)
Dept: SURGERY | Facility: CLINIC | Age: 49
End: 2024-03-12

## 2024-03-12 DIAGNOSIS — R31.29 MICROSCOPIC HEMATURIA: Primary | ICD-10-CM

## 2024-03-12 RX ORDER — LEVOFLOXACIN 500 MG/1
500 TABLET, FILM COATED ORAL DAILY
Qty: 5 TABLET | Refills: 0 | Status: SHIPPED | OUTPATIENT
Start: 2024-03-12 | End: 2024-03-17

## 2024-03-12 NOTE — TELEPHONE ENCOUNTER
Pathnostics PCR  Actinotignum schaalii <10K  Alloscardovia <10K  Ureaplasma <10K  [facility] Strep 10-50K    Recommendation Levaquin > Augmentin > doxycycline    Will treat with Levaquin every day x 5 days.

## 2024-03-25 ENCOUNTER — LAB ENCOUNTER (OUTPATIENT)
Dept: LAB | Age: 49
End: 2024-03-25
Attending: PHYSICIAN ASSISTANT
Payer: COMMERCIAL

## 2024-03-25 DIAGNOSIS — R31.29 MICROSCOPIC HEMATURIA: ICD-10-CM

## 2024-03-25 PROCEDURE — 81015 MICROSCOPIC EXAM OF URINE: CPT

## 2024-03-26 ENCOUNTER — TELEPHONE (OUTPATIENT)
Dept: SURGERY | Facility: CLINIC | Age: 49
End: 2024-03-26

## 2024-03-26 NOTE — TELEPHONE ENCOUNTER
This encounter is now closed.     Appt made on 4/8 was previously scheduled. See Mychart.     Appt on 4/8 now cancelled. RN called patient. No answer. Left message to call office to set up cysto with Dr Yusuf.

## 2024-03-26 NOTE — TELEPHONE ENCOUNTER
Patient inquired to know if someone can call back to reschedule scope states she doesn't ever remember scheduling procedure 04/08

## 2024-03-27 NOTE — TELEPHONE ENCOUNTER
This encounter is now closed.     Patient already on the schedule 4/5 with Dr Yusuf for cystoscopy.

## 2024-04-01 ENCOUNTER — HOSPITAL ENCOUNTER (OUTPATIENT)
Dept: CT IMAGING | Facility: HOSPITAL | Age: 49
Discharge: HOME | End: 2024-04-01
Payer: COMMERCIAL

## 2024-04-01 DIAGNOSIS — R31.29 MICROHEMATURIA: ICD-10-CM

## 2024-04-01 LAB
CREAT BLD-MCNC: 0.8 MG/DL
EGFRCR SERPLBLD CKD-EPI 2021: 90 ML/MIN/1.73M2 (ref 60–?)

## 2024-04-01 PROCEDURE — 76377 3D RENDER W/INTRP POSTPROCES: CPT

## 2024-04-01 PROCEDURE — 74178 CT ABD&PLV WO CNTR FLWD CNTR: CPT

## 2024-04-01 PROCEDURE — 82565 ASSAY OF CREATININE: CPT

## 2024-04-04 ENCOUNTER — TELEPHONE (OUTPATIENT)
Dept: SURGERY | Facility: CLINIC | Age: 49
End: 2024-04-04

## 2024-04-04 NOTE — PROGRESS NOTES
Clinic Procedure Note    INDICATIONS:    Patient is a(n) 48 year old female with hx of endometriosis and anxiety who presents for microhematuria fu.     Pt had UTI on 24. Had sx of dysuria, frequency, urgency. No gross hematuria. Culture >100k klebsiella cefazolin R, macrobid I. She was tx with abx.    Repeat microscopic on 24 had 5-9RBC and generally dirty but thought to be 2/2 pt beginning period. Culture done at this time was also neg. Repeat cath sample completed on 24 with 3-4RBC and 6-9WBC.  Currently, she has no urinary complaints. Feels UTI completely resolved. No fhx of  cx. No smoking hx. Gets UTIs infrequently. Works as wound care RN.   UA at last visit trace intact blood, moderate leuks. Cytology was negative.   She saw CLAIR with the above; CTU and cysto were recommended.   CTU  normal. Here now for cysto. Doing well. No UTI symptoms today.     PVR 0cc today    PROCEDURE:       1. Flexible cystourethroscopy    DATE OF PROCEDURE: 2024     PRE-PROCEDURE DIAGNOSIS: Microhematuria     POST-PROCEDURE DIAGNOSIS: Same     SURGEON: Chava Yusuf MD    FINDINGS:    Urethra: Slightly narrow caliber urethra throughout without lesions    Bladder: Normal mucosa with no papillary lesions or erythema, slight trabeculation    Ureteral orifices: Orthotopic    Other findings: None    PROCEDURE:   Patient was brought to the procedure suite and a time-out was performed identifiying the patient,  and procedure to be performed. The risks and benefits of the procedure were once again discussed with the patient including bleeding, infection, and dysuria. The patient agreed to proceed. The patient did not have any signs or symptoms of active UTI.    She was placed in supine position on the table with legs to the sides and the genital area was prepped and draped in the standard sterile fashion. A flexible cystoscope was inserted per urethra. There were no urethral strictures present. The bladder was  fully inspected (including retroflexion) and showed findings as above. Both ureteral orifices were orthotopic. The scope was then carefully removed and once again no urethral abnormalities were noted.    There were no complications and the patient tolerated the procedure well.    IMPRESSION/PLAN:   Overall normal cystoscopy, CTU for microhematuria   Cytology negative      RTC 3mo for UA and to ensure no further UTI; we did briefly discuss UTI preventative strategies today         Chava Yusuf MD  Staff Urologist  [facility]  Office: [phone]

## 2024-04-04 NOTE — TELEPHONE ENCOUNTER
Patient calling would like to know the instructions she will need before the cystoscopy for tomorrow. Please advise

## 2024-04-05 ENCOUNTER — TELEPHONE (OUTPATIENT)
Dept: SURGERY | Facility: CLINIC | Age: 49
End: 2024-04-05

## 2024-04-05 ENCOUNTER — PROCEDURE (OUTPATIENT)
Dept: SURGERY | Facility: CLINIC | Age: 49
End: 2024-04-05

## 2024-04-05 VITALS — SYSTOLIC BLOOD PRESSURE: 159 MMHG | DIASTOLIC BLOOD PRESSURE: 83 MMHG

## 2024-04-05 DIAGNOSIS — R82.90 URINE FINDING: Primary | ICD-10-CM

## 2024-04-05 LAB
APPEARANCE: CLEAR
BILIRUBIN: NEGATIVE
GLUCOSE (URINE DIPSTICK): NEGATIVE MG/DL
KETONES (URINE DIPSTICK): NEGATIVE MG/DL
MULTISTIX LOT#: ABNORMAL NUMERIC
NITRITE, URINE: NEGATIVE
PH, URINE: 5.5 (ref 4.5–8)
PROTEIN (URINE DIPSTICK): NEGATIVE MG/DL
SPECIFIC GRAVITY: 1.01 (ref 1–1.03)
URINE-COLOR: YELLOW
UROBILINOGEN,SEMI-QN: 0.2 MG/DL (ref 0–1.9)

## 2024-04-05 PROCEDURE — 51798 US URINE CAPACITY MEASURE: CPT | Performed by: UROLOGY

## 2024-04-05 PROCEDURE — 3079F DIAST BP 80-89 MM HG: CPT | Performed by: UROLOGY

## 2024-04-05 PROCEDURE — 52000 CYSTOURETHROSCOPY: CPT | Performed by: UROLOGY

## 2024-04-05 PROCEDURE — 81003 URINALYSIS AUTO W/O SCOPE: CPT | Performed by: UROLOGY

## 2024-04-05 PROCEDURE — 3077F SYST BP >= 140 MM HG: CPT | Performed by: UROLOGY

## 2024-04-05 RX ORDER — CIPROFLOXACIN 500 MG/1
500 TABLET, FILM COATED ORAL ONCE
Status: COMPLETED | OUTPATIENT
Start: 2024-04-05 | End: 2024-04-05

## 2024-04-05 RX ADMIN — CIPROFLOXACIN 500 MG: 500 TABLET, FILM COATED ORAL at 08:01:00

## 2024-04-05 NOTE — TELEPHONE ENCOUNTER
Patient calling stating she had a cystoscopy done today morning and is experiencing  pain when urinating and when cleaning is pink color.Would like to know if is normal.Please advise

## 2024-08-20 ENCOUNTER — PATIENT MESSAGE (OUTPATIENT)
Dept: FAMILY MEDICINE CLINIC | Facility: CLINIC | Age: 49
End: 2024-08-20

## 2024-08-20 ENCOUNTER — LAB ENCOUNTER (OUTPATIENT)
Dept: LAB | Age: 49
End: 2024-08-20
Attending: EMERGENCY MEDICINE
Payer: COMMERCIAL

## 2024-08-20 ENCOUNTER — OFFICE VISIT (OUTPATIENT)
Dept: FAMILY MEDICINE CLINIC | Facility: CLINIC | Age: 49
End: 2024-08-20
Payer: COMMERCIAL

## 2024-08-20 VITALS
RESPIRATION RATE: 12 BRPM | SYSTOLIC BLOOD PRESSURE: 140 MMHG | OXYGEN SATURATION: 98 % | WEIGHT: 176 LBS | DIASTOLIC BLOOD PRESSURE: 78 MMHG | HEIGHT: 61 IN | BODY MASS INDEX: 33.23 KG/M2 | HEART RATE: 105 BPM

## 2024-08-20 DIAGNOSIS — Z13.0 SCREENING, IRON DEFICIENCY ANEMIA: ICD-10-CM

## 2024-08-20 DIAGNOSIS — E66.9 OBESITY (BMI 30-39.9): ICD-10-CM

## 2024-08-20 DIAGNOSIS — Z00.00 ENCOUNTER FOR ANNUAL PHYSICAL EXAMINATION EXCLUDING GYNECOLOGICAL EXAMINATION IN A PATIENT OLDER THAN 17 YEARS: Primary | ICD-10-CM

## 2024-08-20 DIAGNOSIS — R31.29 MICROSCOPIC HEMATURIA: ICD-10-CM

## 2024-08-20 DIAGNOSIS — Z00.00 ENCOUNTER FOR ANNUAL PHYSICAL EXAMINATION EXCLUDING GYNECOLOGICAL EXAMINATION IN A PATIENT OLDER THAN 17 YEARS: ICD-10-CM

## 2024-08-20 DIAGNOSIS — Z13.21 ENCOUNTER FOR VITAMIN DEFICIENCY SCREENING: ICD-10-CM

## 2024-08-20 DIAGNOSIS — R03.0 BLOOD PRESSURE ELEVATED WITHOUT HISTORY OF HTN: ICD-10-CM

## 2024-08-20 LAB
ALBUMIN SERPL-MCNC: 4.6 G/DL (ref 3.2–4.8)
ALBUMIN/GLOB SERPL: 1.5 {RATIO} (ref 1–2)
ALP LIVER SERPL-CCNC: 92 U/L
ALT SERPL-CCNC: 40 U/L
ANION GAP SERPL CALC-SCNC: 7 MMOL/L (ref 0–18)
AST SERPL-CCNC: 24 U/L (ref ?–34)
BASOPHILS # BLD AUTO: 0.09 X10(3) UL (ref 0–0.2)
BASOPHILS NFR BLD AUTO: 0.8 %
BILIRUB SERPL-MCNC: 0.6 MG/DL (ref 0.3–1.2)
BUN BLD-MCNC: 15 MG/DL (ref 9–23)
CALCIUM BLD-MCNC: 10 MG/DL (ref 8.7–10.4)
CHLORIDE SERPL-SCNC: 107 MMOL/L (ref 98–112)
CHOLEST SERPL-MCNC: 247 MG/DL (ref ?–200)
CO2 SERPL-SCNC: 24 MMOL/L (ref 21–32)
CREAT BLD-MCNC: 0.85 MG/DL
DEPRECATED HBV CORE AB SER IA-ACNC: 131.1 NG/ML
EGFRCR SERPLBLD CKD-EPI 2021: 84 ML/MIN/1.73M2 (ref 60–?)
EOSINOPHIL # BLD AUTO: 0.13 X10(3) UL (ref 0–0.7)
EOSINOPHIL NFR BLD AUTO: 1.2 %
ERYTHROCYTE [DISTWIDTH] IN BLOOD BY AUTOMATED COUNT: 14.1 %
FASTING PATIENT LIPID ANSWER: YES
FASTING STATUS PATIENT QL REPORTED: YES
GLOBULIN PLAS-MCNC: 3.1 G/DL (ref 2–3.5)
GLUCOSE BLD-MCNC: 99 MG/DL (ref 70–99)
HCT VFR BLD AUTO: 46.9 %
HDLC SERPL-MCNC: 50 MG/DL (ref 40–59)
HGB BLD-MCNC: 15.6 G/DL
IMM GRANULOCYTES # BLD AUTO: 0.05 X10(3) UL (ref 0–1)
IMM GRANULOCYTES NFR BLD: 0.5 %
IRON SATN MFR SERPL: 41 %
IRON SERPL-MCNC: 161 UG/DL
LDLC SERPL CALC-MCNC: 174 MG/DL (ref ?–100)
LYMPHOCYTES # BLD AUTO: 4.1 X10(3) UL (ref 1–4)
LYMPHOCYTES NFR BLD AUTO: 37.7 %
MCH RBC QN AUTO: 28.3 PG (ref 26–34)
MCHC RBC AUTO-ENTMCNC: 33.3 G/DL (ref 31–37)
MCV RBC AUTO: 85.1 FL
MONOCYTES # BLD AUTO: 0.66 X10(3) UL (ref 0.1–1)
MONOCYTES NFR BLD AUTO: 6.1 %
NEUTROPHILS # BLD AUTO: 5.84 X10 (3) UL (ref 1.5–7.7)
NEUTROPHILS # BLD AUTO: 5.84 X10(3) UL (ref 1.5–7.7)
NEUTROPHILS NFR BLD AUTO: 53.7 %
NONHDLC SERPL-MCNC: 197 MG/DL (ref ?–130)
OSMOLALITY SERPL CALC.SUM OF ELEC: 287 MOSM/KG (ref 275–295)
PLATELET # BLD AUTO: 379 10(3)UL (ref 150–450)
POTASSIUM SERPL-SCNC: 4.2 MMOL/L (ref 3.5–5.1)
PROT SERPL-MCNC: 7.7 G/DL (ref 5.7–8.2)
RBC # BLD AUTO: 5.51 X10(6)UL
SODIUM SERPL-SCNC: 138 MMOL/L (ref 136–145)
TOTAL IRON BINDING CAPACITY: 389 UG/DL (ref 250–425)
TRANSFERRIN SERPL-MCNC: 301 MG/DL (ref 250–380)
TRIGL SERPL-MCNC: 130 MG/DL (ref 30–149)
VIT D+METAB SERPL-MCNC: 13.6 NG/ML (ref 30–100)
VLDLC SERPL CALC-MCNC: 26 MG/DL (ref 0–30)
WBC # BLD AUTO: 10.9 X10(3) UL (ref 4–11)

## 2024-08-20 PROCEDURE — 80053 COMPREHEN METABOLIC PANEL: CPT | Performed by: EMERGENCY MEDICINE

## 2024-08-20 PROCEDURE — 3077F SYST BP >= 140 MM HG: CPT | Performed by: EMERGENCY MEDICINE

## 2024-08-20 PROCEDURE — 82306 VITAMIN D 25 HYDROXY: CPT | Performed by: EMERGENCY MEDICINE

## 2024-08-20 PROCEDURE — 80061 LIPID PANEL: CPT | Performed by: EMERGENCY MEDICINE

## 2024-08-20 PROCEDURE — 83540 ASSAY OF IRON: CPT | Performed by: EMERGENCY MEDICINE

## 2024-08-20 PROCEDURE — 3008F BODY MASS INDEX DOCD: CPT | Performed by: EMERGENCY MEDICINE

## 2024-08-20 PROCEDURE — 83550 IRON BINDING TEST: CPT | Performed by: EMERGENCY MEDICINE

## 2024-08-20 PROCEDURE — 99396 PREV VISIT EST AGE 40-64: CPT | Performed by: EMERGENCY MEDICINE

## 2024-08-20 PROCEDURE — 3078F DIAST BP <80 MM HG: CPT | Performed by: EMERGENCY MEDICINE

## 2024-08-20 PROCEDURE — 85025 COMPLETE CBC W/AUTO DIFF WBC: CPT | Performed by: EMERGENCY MEDICINE

## 2024-08-20 PROCEDURE — 82728 ASSAY OF FERRITIN: CPT | Performed by: EMERGENCY MEDICINE

## 2024-08-20 RX ORDER — ESTRADIOL 10 UG/1
INSERT VAGINAL
COMMUNITY
Start: 2024-08-05

## 2024-08-20 RX ORDER — HYDROCORTISONE ACETATE 25 MG/1
SUPPOSITORY RECTAL
COMMUNITY
Start: 2024-08-05

## 2024-08-20 NOTE — PATIENT INSTRUCTIONS
Thank you for choosing [facility]  To Do:  FOR KARTIK EAST    Overall decreased caloric intake in order to promote weight loss.  Eat 1500 Kaz per day.  Good meal planning, keep a food diary.  May use phone neftali track calories such as [employer].  May also try weight watcher's for an easy point system to keep track of food and caloric intake.  TRY NOOM for weight loss  Follow uyp yearly or as needed  Follow up with dietician for nutritional guidance  Monitod BP daily  Follow up with nurse visit in 1-2 weeks for BP check. Will need Tx if still high      Well balanced diet recommended.    Routine exercise recommended most days during the week.  Wear sunscreen - SPF 15 or higher and reapply every 2 hours as needed.  Wear seat belts and drive safely.  Schedule regular appointments with dentist.  Schedule yearly eye exam if you wear glasses/contacts.  Yearly Flu Vaccine recommended in the fall  Tetanus, Diptheria and Pertussis vaccine should be given every 7-10 years.  Call or come in if there are concerns regarding domestic abuse, sexually transmitted diseases, alcohol/drug addiction, depression/anxiety issues, or any further concerns.        Preventing Osteoporosis: Meeting Your Calcium Needs    Your body needs calcium to build and repair bones. But it can't make calcium on its own. That's why it's important to eat calcium-rich foods. Some foods are naturally rich in calcium. Others have calcium added (fortified). It's best to get calcium from the foods you eat. But if you can't get enough, you may want to take calcium supplements. To meet your daily calcium needs, try the foods listed below.  Dairy Fish & beans Other sources      Source   Calcium (mg) per serving   Source   Calcium (mg) per serving   Source   Calcium (mg) per serving      Low-fat yogurt, plain   415 mg/8 oz.   Sardines, Atlantic, canned, with bones   351 mg/3 oz.   Oatmeal, instant, fortified   215 mg/1 cup   Nonfat milk   302 mg/1 cup    [place], sockeye, canned, with bones   239 mg/3 oz.   Tofu made with calcium sulfate   204 mg/3 oz.   Low-fat milk   297 mg/1 cup   Soybeans, fresh, boiled   131 mg/1/2 cup   Collards   179 mg/1/2 cup   Swiss cheese   272 mg/1 oz.   White beans, cooked   81 mg/1/2 cup   English muffin, whole wheat   175 mg/1 muffin   Cheddar cheese   205 mg/1 oz.   Navy beans, cooked   79 mg/1/2 cup   Kale   90 mg/1/2 cup   Ice cream strawberry   79 mg/1/2 cup           Orange, navel   56 mg/1 medium   Note: Calcium levels may vary depending on brand and size.  Daily calcium needs  14-18 years old: 1,300 mg  19-30 years old: 1,000 mg  31-50 years old: 1,000 mg  51-70 years old, women: 1,200 mg  51-70 years old, men: 1,000 mg  Pregnant or nursin-28 years old: 1,300 mg, 19-50 years old: 1,000 mg  Older than 70 (women and men): 1,200 mg   Date Last Reviewed: 10/17/2015  © 9022-8281 [employer]. [address] [place] [address], [place] [address]. All rights reserved. This information is not intended as a substitute for professional medical care. Always follow your healthcare professional's instructions.        Prevention Guidelines, Women Ages 40 to 49  Screening tests and vaccines are an important part of managing your health. Health counseling is essential, too. Below are guidelines for these, for women ages 40 to 49. Talk with your healthcare provider to make sure you’re up-to-date on what you need.  Screening Who needs it How often   Type 2 diabetes or prediabetes All adults beginning at age 45 and adults without symptoms at any age who are overweight or obese and have 1 or more additional risk factors for diabetes At least every 3 years   Alcohol misuse All women in this age group At routine exams   Blood pressure All women in this age group Every 2 years if your blood pressure is less than 120/80 mm Hg; yearly if your systolic blood pressure is 120 to 139 mm Hg, or your diastolic blood pressure reading is 80 to 89 mm Hg    Breast cancer All women in this age group Yearly mammogram and clinical breast exam2  Each woman should make her own decision. If a woman decides to have mammograms before age 50, they should be done every 2 years.3   Cervical cancer All women in this age group, except women who have had a complete hysterectomy Pap test every 3 years or Pap test plus human papilloma virus (HPV) test every 5 years   Chlamydia Women at increased risk for infection At routine exams if you're at risk or have symptoms   Depression All women in this age group At routine exams   Gonorrhea Sexually active women at increased risk for infection At routine exams   Hepatitis C Anyone at increased risk; 1 time for those born between 1945 and 1965 At routine exams   High cholesterol or triglycerides All women ages 45 and older who are at risk for coronary artery disease; younger women, talk with your healthcare provider At least every 5 years   HIV All women At routine exams   Obesity All women in this age group At routine exams   Syphilis Women at increased risk for infection - talk with your healthcare provider At routine exams   Tuberculosis Women at increased risk for infection - talk with your healthcare provider Ask your healthcare provider   Vision All women in this age group Complete exam at age 40 and eye exams every 2 to 4 years. If you have a chronic disease, ask your healthcare provider how often you should have your eyes examined.4   Vaccine Who needs it How often   Chickenpox (varicella) All women in this age group who have no record of this infection or vaccine 2 doses; the second dose should be given at least 4 weeks after the first dose   Hepatitis A Women at increased risk for infection - talk with your healthcare provider 2 doses given 6 months apart   Hepatitis B Women at increased risk for infection - talk with your healthcare provider 3 doses over 6 months; second dose should be given 1 month after the first dose; the  third dose should be given at least 2 months after the second dose and at least 4 months after the first dose   Haemophilus influenzae Type B (HIB) Women at increased risk 1 to 3 doses   Influenza (flu) All women in this age group Once a year   Measles, mumps, rubella (MMR) All women in this age group who have no record of these infections or vaccines 1 or 2 doses   Meningococcal Women at increased risk for infection - talk with your healthcare provider 1 or more doses   Pneumococcal conjugate vaccine (PCV13) and pneumococcal polysaccharide vaccine (PPSV23) Women at increased risk for infection - talk with your healthcare provider 1 or 2 doses   Tetanus/diphtheria/pertussis (Td/Tdap) booster All women in this age group A one-time dose of Tdap instead of a Td booster after age 18, then Td every 10 years   Counseling Who needs it How often   BRCA gene mutation testing for breast and ovarian cancer susceptibility Women with increased risk for having gene mutation When your risk is known   Breast cancer and chemoprevention Women at high risk for breast cancer When your risk is known   Diet and exercise Women who are overweight or obese When diagnosed, and then at routine exams   Domestic violence Women at the age in which they are able to have children At routine exams   Sexually transmitted infection prevention Women at increased risk for infection - talk with your healthcare provider At routine exams   Use of tobacco and the health effects it can cause All women in this age group Every exam   1American Diabetes Association  2American Cancer Society  3U.S. Preventive Services Task Force  4[facility] [employer] of Ophthalmology  Date Last Reviewed: 8/27/2015 © 2000-2016 [employer]. [address] [place] [address], [place] [address]. All rights reserved. This information is not intended as a substitute for professional medical care. Always follow your healthcare professional's instructions.

## 2024-08-20 NOTE — TELEPHONE ENCOUNTER
From: Rhael Cash  To: Toño Zhou  Sent: 8/20/2024 11:15 AM CDT  Subject: Autoimmune tests    Hello I forgot to ask when I was there do you think it’s important to check for other autoimmune things? Like should I get a test for lupus? Thank you.

## 2024-08-20 NOTE — PROGRESS NOTES
Rahel Cash is a 48 year old female who presents for a complete physical exam.   HPI:     Chief Complaint   Patient presents with    Well Adult       Age: 48    1First day of last menstrual period (or first year of         menstruation, if through menopause thursday  ON seasonique   2Number of times pregnant: 2              Number of completed pregnancies:  2              Date of last pregnancy:  2004   3. If you are under age 55, what method of birth control do you use? Seasonique              Are you planning a pregnancy  in the next 6-12 months? NOT               If you are through menopause or over age 50, do you take  any of the following pills?                          Calcium NO                        Estrogen (Premarin)              NO                        Progesterone (Provera) NO   4. Have you had any of the following problems:               A.  Abnormal Pap smears  NO all normal               If yes, date:                 problem:                 For abnormality, did you have any of the following done:                    Colposcopy  NO                  Biopsies     NO                  Surgery     NO, but S/P ablation, abd laparoscopy.            B. High blood pressure, heart disease or high cholesterol NO                  D.Abdominal or pelvic surgery or special tests ablation   5. Do you have any of the following:      Problems with present method of birth control NO   Bleeding between periods or since periods stopped NO    A new or enlarging lump in breast NO      Change in size/firmness of stools   NO   Change in size/color of a mole NO   6. Do you have a parent, brother or sister with a history of the following:                  Cancer of the breast, intestine or female organs Paternal grandmother, 50's  No ovarian                Heart pain or heart attacks  before the age of 55 Father with cardiac event in his 50's  Brain aneurysm in dad   8. Have you ever used tobacco?              NO     9. Do you drink alcohol?              YES, holidays   10. Prevention:         b. Exercise:                              Activity: Tries       c. Do you always wear seat belts?          YES       d. If over 30 years old, have you  had your cholesterol level checked  in the past five years? YES       e. Have you had a tetanus shot  the past 10 years? 2019       f. Does your house have a working smoke detector? YES        g. Do you have firearms at home?            YES         h. Have you ever had a mammogram?  YES     j. When is the last time you had           a dental check-up?  Within 6 months ago         11. Please describe any concerns you have:       Worried about weight gain. Gained 15 lbs in 4 years  Patient admits to liberal eating but tries to be mindful.  Denies any binge eating.        BP Readings from Last 5 Encounters:   08/20/24 140/78   04/05/24 159/83   07/16/23 157/79   03/04/23 138/88   01/16/23 155/82         Wt Readings from Last 6 Encounters:   08/20/24 176 lb (79.8 kg)   07/16/23 150 lb (68 kg)   03/04/23 177 lb 12 oz (80.6 kg)   02/10/23 170 lb (77.1 kg)   01/16/23 182 lb (82.6 kg)   12/12/22 165 lb (74.8 kg)       .             Wt Readings from Last 3 Encounters:   08/20/24 176 lb (79.8 kg)   07/16/23 150 lb (68 kg)   03/04/23 177 lb 12 oz (80.6 kg)        BP Readings from Last 3 Encounters:   08/20/24 140/78   04/05/24 159/83   07/16/23 157/79         Patient's last menstrual period was 08/17/2024 (approximate).       Current Outpatient Medications   Medication Sig Dispense Refill    Estradiol 10 MCG Vaginal Tab 1 PV at bedtime x 14 nights, then twice weekly thereafter      hydrocortisone 25 MG Rectal Suppos 1 PV qhs      Levonorgest-Eth Estrad 91-Day 0.15-0.03 &0.01 MG Oral Tab Take one tab by mouth daily 91 tablet 4      Past Medical History:    Body piercing    Ears    Endometriosis    Hemorrhoids    After pregnancy    Wears glasses    Readers      Past Surgical History:   Procedure  Laterality Date      2004    Endometrial ablation  2006    Tonsillectomy        Family History   Problem Relation Age of Onset    Other (hypo thyroid) Mother     Breast Cancer Paternal Grandmother 40      Social History     Socioeconomic History    Marital status:    Tobacco Use    Smoking status: Never    Smokeless tobacco: Never   Vaping Use    Vaping status: Never Used   Substance and Sexual Activity    Alcohol use: Not Currently    Drug use: No    Sexual activity: Yes     Partners: Male     Birth control/protection: Pill, Vasectomy        Current Outpatient Medications   Medication Sig Dispense Refill    Estradiol 10 MCG Vaginal Tab 1 PV at bedtime x 14 nights, then twice weekly thereafter      hydrocortisone 25 MG Rectal Suppos 1 PV qhs      Levonorgest-Eth Estrad 91-Day 0.15-0.03 &0.01 MG Oral Tab Take one tab by mouth daily 91 tablet 4      Past Medical History:    Body piercing    Ears    Endometriosis    Hemorrhoids    After pregnancy    Wears glasses    Readers      Past Surgical History:   Procedure Laterality Date      2004    Endometrial ablation      Tonsillectomy        Family History   Problem Relation Age of Onset    Other (hypo thyroid) Mother     Breast Cancer Paternal Grandmother 40      Social History:   Social History     Socioeconomic History    Marital status:    Tobacco Use    Smoking status: Never    Smokeless tobacco: Never   Vaping Use    Vaping status: Never Used   Substance and Sexual Activity    Alcohol use: Not Currently    Drug use: No    Sexual activity: Yes     Partners: Male     Birth control/protection: Pill, Vasectomy            REVIEW OF SYSTEMS:   GENERAL HEALTH: feels well, no fatigue.  SKIN: denies any unusual skin lesions or rashes  EYES: no visual complaints or deficits  HEENT: denies nasal congestion, sinus pain or sore throat; hearing loss negative,   RESPIRATORY: denies shortness of breath, wheezing or cough    CARDIOVASCULAR: denies chest pain, SOB, edema,orthopnea, no palpitations   GI: denies nausea, vomiting, constipation, diarrhea; no rectal bleeding; no heartburn  GENITAL/: no dysuria, urgency or frequency  MUSCULOSKELETAL: no joint complaints upper or lower extremities  NEURO: no sensory or motor complaint  HEMATOLOGY: denies hx anemia; denies bruising or excessive bleeding  ENDOCRINE: denies excessive thirst or urination; denies unexpected wt gain or wt loss  ALLERGY/IMM.: denies food or seasonal allergies  PSYCH: no symptoms of depression or anxiety, depression screening negative.      EXAM:   /78   Pulse 105   Resp 12   Ht 5' 1\" (1.549 m)   Wt 176 lb (79.8 kg)   LMP 08/17/2024 (Approximate)   SpO2 98%   BMI 33.25 kg/m²      General: WD/WN in no acute distress.   HEENT: PERRLA and EOMI.  OP moist no lesions.TM WNL, emperatriz.Normal ears canals bilaterally.  Neck is supple, with no cervical LAD or thyroid abnormalities. No carotid bruits.    Lungs: are clear to auscultation bilaterally, with no wheeze, rhonchi, or rales.   Heart: is RRR.  S1, S2, with no murmurs,clicks, gallops    Abdomen: is soft,NBS, NT/ND with no HSM.  No rebound or guarding. No CVA tenderness, no hernias.   exam: Deferred     Neuro: Cranial nerves II-XII normal,no focal abnormalities, and reflexes coordination and gait normal and symmetric.Sensation intact.  Extremities: are symmetric with no cyanosis, clubbing, or edema.  MS: Normal muscles tones, no joints abnormalities.  SKIN: Normal color, turgor, no lesions, rashes or wounds.  PSYCH: normal affect and mood.      ASSESSMENT AND PLAN:         1. Encounter for annual physical examination excluding gynecological examination in a patient older than 17 years  - CBC With Differential With Platelet; Future  - Comp Metabolic Panel (14); Future  - Lipid Panel; Future    2. Obesity (BMI 30-39.9)  - OP REFERRAL TO NUTRITIONIST/DIETICIAN    3. Microscopic hematuria  - Urinalysis, Routine  [E]; Future    4. Screening, iron deficiency anemia  - Iron And Tibc; Future  - Ferritin; Future    5. Encounter for vitamin deficiency screening  - Vitamin D; Future    6. Blood pressure elevated without history of HTN          Rahel Cahs is a 48 year old female who presents for a complete physical exam.Gyn exam and Pap smear done UTD.  Self breast exams advised.  The patient should schedule annual mammograms beginning at the age of 40.    Counseled on fat diet and aerobic exercise 30 minutes three times weekly.   Counseled on maintaining a healthy weight and healthy BMI  Health maintenance.   Immunizations reviewed and updated  TDAP UTD  The patient indicates understanding of these issues and agrees to the plan.  The patient is asked  to return yearly for annual preventative health exam.    Well balanced diet recommended.    Routine exercise recommended most days during the week.  Wear sunscreen - SPF 15 or higher and reapply every 2 hours as needed.  Wear seat belts and drive safely.  Schedule regular appointments with dentist.  Schedule yearly eye exam if you wear glasses/contacts.  Yearly Flu Vaccine recommended.  Tetanus, Diptheria and Pertussis vaccine should be given every 7-10 years.  Call or come in if there are concerns regarding domestic abuse, sexually transmitted diseases, alcohol/drug addiction, depression/anxiety issues, or any further concerns.    PATIENT INSTRUCTIONS:    Overall decreased caloric intake in order to promote weight loss.  Eat 1500 Kaz per day.  Good meal planning, keep a food diary.  May use phone neftali track calories such as [employer].  May also try weight watcher's for an easy point system to keep track of food and caloric intake.  TRY NOOM for weight loss  Follow uyp yearly or as needed  Follow up with dietician for nutritional guidance  Monitod BP daily  Follow up with nurse visit in 1-2 weeks for BP check.Will need Tx if still high      FOLLOW UP:  1-2 weeks      Health  Maintenance Due   Topic Date Due    Mammogram  02/19/2023    COVID-19 Vaccine (4 - 2023-24 season) 09/01/2023    Annual Physical  01/16/2024    HTN: BP Follow-Up  05/05/2024

## 2024-08-21 ENCOUNTER — LAB ENCOUNTER (OUTPATIENT)
Dept: LAB | Age: 49
End: 2024-08-21
Attending: EMERGENCY MEDICINE
Payer: COMMERCIAL

## 2024-08-21 DIAGNOSIS — R31.29 MICROSCOPIC HEMATURIA: ICD-10-CM

## 2024-08-21 LAB
BILIRUB UR QL STRIP.AUTO: NEGATIVE
CLARITY UR REFRACT.AUTO: CLEAR
GLUCOSE UR STRIP.AUTO-MCNC: NORMAL MG/DL
KETONES UR STRIP.AUTO-MCNC: NEGATIVE MG/DL
LEUKOCYTE ESTERASE UR QL STRIP.AUTO: 500
NITRITE UR QL STRIP.AUTO: NEGATIVE
PH UR STRIP.AUTO: 5 [PH] (ref 5–8)
PROT UR STRIP.AUTO-MCNC: NEGATIVE MG/DL
SP GR UR STRIP.AUTO: 1.02 (ref 1–1.03)
UROBILINOGEN UR STRIP.AUTO-MCNC: NORMAL MG/DL

## 2024-08-21 PROCEDURE — 81001 URINALYSIS AUTO W/SCOPE: CPT | Performed by: EMERGENCY MEDICINE

## 2024-09-12 ENCOUNTER — OFFICE VISIT (OUTPATIENT)
Dept: SURGERY | Facility: CLINIC | Age: 49
End: 2024-09-12
Payer: COMMERCIAL

## 2024-09-12 DIAGNOSIS — R82.90 URINE FINDING: Primary | ICD-10-CM

## 2024-09-12 LAB
APPEARANCE: CLEAR
BILIRUBIN: NEGATIVE
GLUCOSE (URINE DIPSTICK): NEGATIVE MG/DL
KETONES (URINE DIPSTICK): NEGATIVE MG/DL
MULTISTIX LOT#: ABNORMAL NUMERIC
NITRITE, URINE: NEGATIVE
PH, URINE: 5.5 (ref 4.5–8)
PROTEIN (URINE DIPSTICK): NEGATIVE MG/DL
SPECIFIC GRAVITY: 1.02 (ref 1–1.03)
URINE-COLOR: YELLOW
UROBILINOGEN,SEMI-QN: 0.2 MG/DL (ref 0–1.9)

## 2024-09-12 PROCEDURE — 81003 URINALYSIS AUTO W/O SCOPE: CPT | Performed by: SURGERY

## 2024-09-12 PROCEDURE — 99214 OFFICE O/P EST MOD 30 MIN: CPT | Performed by: SURGERY

## 2024-09-12 NOTE — PROGRESS NOTES
Urology Clinic Note    Primary Care Provider:  Toño Zhou MD     Chief Complaint:   Microscopic hematuria    HPI:   Rahel Cash is a 49 year old female with history of endometriosis, anxiety who was seen previously by JENNIFER Frazier and Dr. Yusuf for microscopic hematuria.  No smoking history.  Infrequent UTIs.  CT urogram was normal.  Cystoscopy 2024 was normal.  She currently uses topical vaginal estrogen cream.  She returns today as most recent UA on 2024 shows 6-10 RBC.  This is similar to prior UA on 3/24/2024 showing 6-10 RBC.    She denies gross hematuria.  She does have slight urinary urgency today but denies dysuria.    Urinalysis: Large LE, trace blood    PSA:  No results found for: \"PSA\", \"PERCENTPSA\", \"PSAS\", \"PSAULTRA\", \"QPSA\", \"PSATOT\", \"TOTPSADX\", \"TOTPSASCREEN\"     History:     Past Medical History:    Body piercing    Ears    Endometriosis    Hemorrhoids    After pregnancy    Wears glasses    Readers       Past Surgical History:   Procedure Laterality Date      ,     Endometrial ablation  2006    Tonsillectomy         Family History   Problem Relation Age of Onset    Other (hypo thyroid) Mother     Breast Cancer Paternal Grandmother 40       Social History     Socioeconomic History    Marital status:    Tobacco Use    Smoking status: Never    Smokeless tobacco: Never   Vaping Use    Vaping status: Never Used   Substance and Sexual Activity    Alcohol use: Not Currently    Drug use: No    Sexual activity: Yes     Partners: Male     Birth control/protection: Pill, Vasectomy       Medications (Active prior to today's visit):  Current Outpatient Medications   Medication Sig Dispense Refill    Estradiol 10 MCG Vaginal Tab 1 PV at bedtime x 14 nights, then twice weekly thereafter      hydrocortisone 25 MG Rectal Suppos 1 PV qhs      Levonorgest-Eth Estrad 91-Day 0.15-0.03 &0.01 MG Oral Tab Take one tab by mouth daily 91 tablet 4       Allergies:  No  Known Allergies    Review of Systems:   A comprehensive 10-point review of systems was completed.  Pertinent positives and negatives are noted in the the HPI.    Physical Exam:   CONSTITUTIONAL: Well developed, well nourished, in no acute distress  NEUROLOGIC: Alert and oriented  HEAD: Normocephalic, atraumatic  EYES: Sclera non-icteric  ENT: Hearing intact, moist mucous membranes  NECK: No obvious goiter or masses  RESPIRATORY: Normal respiratory effort  SKIN: No evident rashes  ABDOMEN: Soft, non-tender, non-distended    Assessment & Plan:   Rahel Cash is a 49 year old female with history of endometriosis, anxiety who was seen previously by JENNIFER Frazier and Dr. Yusuf for microscopic hematuria.  No smoking history.  Infrequent UTIs.  CT urogram was normal.  Cystoscopy 4/5/2024 was normal.  She currently uses topical vaginal estrogen cream.  She returns today as most recent UA on 8/21/2024 shows 6-10 RBC.  This is similar to prior UA on 3/24/2024 showing 6-10 RBC.    She denies gross hematuria.  She does have slight urinary urgency today but denies dysuria.    -No further hematuria workup recommended unless worsening microscopic hematuria amount or new gross hematuria in the future  -Follow-up urine culture    In total, 30 minutes were spent on this patient encounter (including chart review, patient history, physical, and counseling, documentation, and communication).    Jhony Guevara MD  Staff Urologist  [facility]  Office: [phone]

## 2024-09-16 NOTE — PROGRESS NOTES
Urine culture with 10-50 K group B strep.  Given mild urinary symptoms at most recent visit will send antibiotics.  [employer] message sent in clinic staff to call.

## 2024-09-20 NOTE — TELEPHONE ENCOUNTER
Yes, ok to take D3 and K2, no indication for autoimmune testing for now, but needs OV or VV to discuss if any new symptoms.

## 2024-10-02 ENCOUNTER — TELEPHONE (OUTPATIENT)
Dept: SURGERY | Facility: CLINIC | Age: 49
End: 2024-10-02

## 2024-10-02 NOTE — TELEPHONE ENCOUNTER
This encounter is now closed.     RN called to follow up if patient  the antibiotic prescribed on 9/16  Per pharmacist, it was picked up on 9/17

## 2024-10-02 NOTE — TELEPHONE ENCOUNTER
----- Message from Jhony Guevara sent at 9/16/2024  4:43 PM CDT -----  Can someone call this patient and let her know I sent a course of antibiotics for likely UTI.    Thanks,  MB

## 2024-11-04 ENCOUNTER — PATIENT MESSAGE (OUTPATIENT)
Dept: SURGERY | Facility: CLINIC | Age: 49
End: 2024-11-04

## 2024-11-04 ENCOUNTER — LAB ENCOUNTER (OUTPATIENT)
Dept: LAB | Age: 49
End: 2024-11-04
Attending: EMERGENCY MEDICINE
Payer: COMMERCIAL

## 2024-11-04 DIAGNOSIS — R82.90 URINE FINDING: Primary | ICD-10-CM

## 2024-11-04 DIAGNOSIS — R82.90 URINE FINDING: ICD-10-CM

## 2024-11-04 LAB
BILIRUB UR QL STRIP.AUTO: NEGATIVE
CLARITY UR REFRACT.AUTO: CLEAR
COLOR UR AUTO: YELLOW
GLUCOSE UR STRIP.AUTO-MCNC: NORMAL MG/DL
KETONES UR STRIP.AUTO-MCNC: NEGATIVE MG/DL
LEUKOCYTE ESTERASE UR QL STRIP.AUTO: 500
NITRITE UR QL STRIP.AUTO: NEGATIVE
PH UR STRIP.AUTO: 6 [PH] (ref 5–8)
PROT UR STRIP.AUTO-MCNC: 20 MG/DL
SP GR UR STRIP.AUTO: 1.03 (ref 1–1.03)
UROBILINOGEN UR STRIP.AUTO-MCNC: NORMAL MG/DL
WBC #/AREA URNS AUTO: >50 /HPF

## 2024-11-04 PROCEDURE — 81001 URINALYSIS AUTO W/SCOPE: CPT

## 2024-11-04 PROCEDURE — 87186 SC STD MICRODIL/AGAR DIL: CPT

## 2024-11-04 PROCEDURE — 87086 URINE CULTURE/COLONY COUNT: CPT

## 2024-11-04 PROCEDURE — 87077 CULTURE AEROBIC IDENTIFY: CPT

## 2024-11-26 ENCOUNTER — V-VISIT (OUTPATIENT)
Dept: URGENT CARE | Age: 49
End: 2024-11-26

## 2024-11-26 VITALS
TEMPERATURE: 98.3 F | RESPIRATION RATE: 18 BRPM | HEIGHT: 61 IN | SYSTOLIC BLOOD PRESSURE: 128 MMHG | HEART RATE: 98 BPM | BODY MASS INDEX: 31.15 KG/M2 | DIASTOLIC BLOOD PRESSURE: 88 MMHG | WEIGHT: 165 LBS | OXYGEN SATURATION: 98 %

## 2024-11-26 DIAGNOSIS — N30.00 ACUTE CYSTITIS WITHOUT HEMATURIA: Primary | ICD-10-CM

## 2024-11-26 LAB
APPEARANCE, POC: ABNORMAL
BILIRUB UR QL STRIP: ABNORMAL
COLOR UR: YELLOW
GLUCOSE UR-MCNC: NEGATIVE MG/DL
HGB UR QL STRIP: ABNORMAL
INTERNAL PROCEDURAL CONTROLS ACCEPTABLE: YES
KETONES UR STRIP-MCNC: ABNORMAL MG/DL
LEUKOCYTE ESTERASE UR QL STRIP: ABNORMAL
NITRITE UR QL STRIP: POSITIVE
PH UR: 6 [PH] (ref 5–7)
PROT UR-MCNC: ABNORMAL MG/DL
SP GR UR: 1 (ref 1–1.03)
TEST LOT EXPIRATION DATE: ABNORMAL
TEST LOT NUMBER: ABNORMAL
UROBILINOGEN UR-MCNC: 1 MG/DL (ref 0–1)

## 2024-11-26 PROCEDURE — 81002 URINALYSIS NONAUTO W/O SCOPE: CPT | Performed by: NURSE PRACTITIONER

## 2024-11-26 PROCEDURE — 99203 OFFICE O/P NEW LOW 30 MIN: CPT | Performed by: NURSE PRACTITIONER

## 2024-11-26 RX ORDER — PHENAZOPYRIDINE HYDROCHLORIDE 200 MG/1
200 TABLET, FILM COATED ORAL 3 TIMES DAILY PRN
Qty: 10 TABLET | Refills: 0 | Status: SHIPPED | OUTPATIENT
Start: 2024-11-26

## 2024-11-26 RX ORDER — ESTRADIOL 10 UG/1
INSERT VAGINAL
COMMUNITY

## 2024-11-26 RX ORDER — LEVONORGESTREL AND ETHINYL ESTRADIOL 150-30(84)
1 KIT ORAL DAILY
COMMUNITY

## 2024-11-26 RX ORDER — HYDROCORTISONE ACETATE 25 MG/1
SUPPOSITORY RECTAL
COMMUNITY

## 2024-11-26 RX ORDER — NITROFURANTOIN 25; 75 MG/1; MG/1
100 CAPSULE ORAL 2 TIMES DAILY
Qty: 10 CAPSULE | Refills: 0 | Status: SHIPPED | OUTPATIENT
Start: 2024-11-26 | End: 2024-12-01

## 2024-11-26 ASSESSMENT — ENCOUNTER SYMPTOMS
RESPIRATORY NEGATIVE: 1
CONSTITUTIONAL NEGATIVE: 1

## 2024-11-29 LAB — BACTERIA UR CULT: ABNORMAL

## 2025-01-10 ENCOUNTER — HOSPITAL ENCOUNTER (OUTPATIENT)
Dept: GENERAL RADIOLOGY | Age: 50
Discharge: HOME | End: 2025-01-10
Attending: EMERGENCY MEDICINE
Payer: COMMERCIAL

## 2025-01-10 ENCOUNTER — OFFICE VISIT (OUTPATIENT)
Dept: FAMILY MEDICINE CLINIC | Facility: CLINIC | Age: 50
End: 2025-01-10
Payer: COMMERCIAL

## 2025-01-10 ENCOUNTER — LAB ENCOUNTER (OUTPATIENT)
Dept: LAB | Age: 50
End: 2025-01-10
Attending: EMERGENCY MEDICINE
Payer: COMMERCIAL

## 2025-01-10 VITALS
SYSTOLIC BLOOD PRESSURE: 126 MMHG | RESPIRATION RATE: 18 BRPM | OXYGEN SATURATION: 98 % | DIASTOLIC BLOOD PRESSURE: 76 MMHG | BODY MASS INDEX: 32.8 KG/M2 | WEIGHT: 176 LBS | HEART RATE: 118 BPM | HEIGHT: 61.5 IN | TEMPERATURE: 98 F

## 2025-01-10 DIAGNOSIS — J40 BRONCHITIS: Primary | ICD-10-CM

## 2025-01-10 DIAGNOSIS — J40 BRONCHITIS: ICD-10-CM

## 2025-01-10 PROBLEM — R03.0 BLOOD PRESSURE ELEVATED WITHOUT HISTORY OF HTN: Status: RESOLVED | Noted: 2019-10-23 | Resolved: 2025-01-10

## 2025-01-10 LAB
BASOPHILS # BLD AUTO: 0.05 X10(3) UL (ref 0–0.2)
BASOPHILS NFR BLD AUTO: 0.5 %
EOSINOPHIL # BLD AUTO: 0.09 X10(3) UL (ref 0–0.7)
EOSINOPHIL NFR BLD AUTO: 0.8 %
ERYTHROCYTE [DISTWIDTH] IN BLOOD BY AUTOMATED COUNT: 13.1 %
HCT VFR BLD AUTO: 45.5 %
HGB BLD-MCNC: 15.4 G/DL
IMM GRANULOCYTES # BLD AUTO: 0.06 X10(3) UL (ref 0–1)
IMM GRANULOCYTES NFR BLD: 0.6 %
LYMPHOCYTES # BLD AUTO: 3.95 X10(3) UL (ref 1–4)
LYMPHOCYTES NFR BLD AUTO: 36.8 %
MCH RBC QN AUTO: 28.4 PG (ref 26–34)
MCHC RBC AUTO-ENTMCNC: 33.8 G/DL (ref 31–37)
MCV RBC AUTO: 83.9 FL
MONOCYTES # BLD AUTO: 0.51 X10(3) UL (ref 0.1–1)
MONOCYTES NFR BLD AUTO: 4.8 %
NEUTROPHILS # BLD AUTO: 6.06 X10 (3) UL (ref 1.5–7.7)
NEUTROPHILS # BLD AUTO: 6.06 X10(3) UL (ref 1.5–7.7)
NEUTROPHILS NFR BLD AUTO: 56.5 %
PLATELET # BLD AUTO: 426 10(3)UL (ref 150–450)
RBC # BLD AUTO: 5.42 X10(6)UL
WBC # BLD AUTO: 10.7 X10(3) UL (ref 4–11)

## 2025-01-10 PROCEDURE — 85025 COMPLETE CBC W/AUTO DIFF WBC: CPT | Performed by: EMERGENCY MEDICINE

## 2025-01-10 PROCEDURE — 3078F DIAST BP <80 MM HG: CPT | Performed by: EMERGENCY MEDICINE

## 2025-01-10 PROCEDURE — 71046 X-RAY EXAM CHEST 2 VIEWS: CPT | Performed by: EMERGENCY MEDICINE

## 2025-01-10 PROCEDURE — 3074F SYST BP LT 130 MM HG: CPT | Performed by: EMERGENCY MEDICINE

## 2025-01-10 PROCEDURE — 99214 OFFICE O/P EST MOD 30 MIN: CPT | Performed by: EMERGENCY MEDICINE

## 2025-01-10 PROCEDURE — 87637 SARSCOV2&INF A&B&RSV AMP PRB: CPT | Performed by: EMERGENCY MEDICINE

## 2025-01-10 PROCEDURE — 3008F BODY MASS INDEX DOCD: CPT | Performed by: EMERGENCY MEDICINE

## 2025-01-10 RX ORDER — CODEINE PHOSPHATE AND GUAIFENESIN 10; 100 MG/5ML; MG/5ML
SOLUTION ORAL 3 TIMES DAILY PRN
Qty: 180 ML | Refills: 0 | Status: SHIPPED | OUTPATIENT
Start: 2025-01-10

## 2025-01-10 RX ORDER — ALBUTEROL SULFATE 90 UG/1
1-2 INHALANT RESPIRATORY (INHALATION) EVERY 4 HOURS PRN
Qty: 1 EACH | Refills: 1 | Status: SHIPPED | OUTPATIENT
Start: 2025-01-10

## 2025-01-10 RX ORDER — CLOBETASOL PROPIONATE 0.5 MG/G
1 OINTMENT TOPICAL 2 TIMES DAILY
COMMUNITY
Start: 2024-12-30

## 2025-01-10 NOTE — PATIENT INSTRUCTIONS
Thank you for choosing [facility]  To Do:  FOR KARTIK EAST    Have Flu test done  Chest Xray  Use albuterol inhaler as needed  Call if not improving , may need Abx   To ER if with any worsening of Sx.  Use Cheratussin with codeine as needed

## 2025-01-10 NOTE — PROGRESS NOTES
Chief Complaint:   No chief complaint on file.    HPI:   This is a 49 year old female       COUGH    Cough for the past 1.5 weeks. \" Wet cough\" and feels tight  Felst feverish last week but none recently  Right ear also starting to hurt  No CP no SOB  No sick contacts.  Non smoker   Tried OTC Mucinex D with no improvement  No body aches or chills        PMSH       Past Medical History:    Body piercing    Ears    Endometriosis    Hemorrhoids    After pregnancy    Wears glasses    Readers     Past Surgical History:   Procedure Laterality Date      ,     Endometrial ablation  2006    Tonsillectomy  1980     Social History:  Social History     Socioeconomic History    Marital status:    Tobacco Use    Smoking status: Never     Passive exposure: Never    Smokeless tobacco: Never   Vaping Use    Vaping status: Never Used   Substance and Sexual Activity    Alcohol use: Not Currently    Drug use: No    Sexual activity: Yes     Partners: Male     Birth control/protection: Pill, Vasectomy   Other Topics Concern    Caffeine Concern Yes    Exercise No    Seat Belt Yes    Special Diet No    Stress Concern No    Weight Concern Yes     Social Drivers of Health     Food Insecurity: No Food Insecurity (1/10/2025)    NCSS - Food Insecurity     Worried About Running Out of Food in the Last Year: No     Ran Out of Food in the Last Year: No   Transportation Needs: No Transportation Needs (1/10/2025)    NCSS - Transportation     Lack of Transportation: No   Housing Stability: Not At Risk (1/10/2025)    NCSS - Housing/Utilities     Has Housing: Yes     Worried About Losing Housing: No     Unable to Get Utilities: No     Family History:  Family History   Problem Relation Age of Onset    Other (hypo thyroid) Mother     Breast Cancer Paternal Grandmother 40     Allergies:  Allergies[1]  Current Meds:  Medications Ordered Prior to Encounter[2]   Counseling given: No         PROBLEM LIST     Patient Active Problem  List   Diagnosis    Rosacea    Endometriosis    S/P endometrial ablation    Palpitations    Anxiety disorder    Special screening for malignant neoplasm of colon    Benign neoplasm of transverse colon    Obesity (BMI 30-39.9)         REVIEW OF SYSTEMS:   Review of systems significant for cough  The rest of the review of systems is negative except those stated as above    PHYSICAL EXAM:   /76 (BP Location: Left arm, Patient Position: Sitting, Cuff Size: adult)   Pulse 118   Temp 98.1 °F (36.7 °C) (Temporal)   Resp 18   Ht 5' 1.5\" (1.562 m)   Wt 176 lb (79.8 kg)   LMP 11/24/2024 (Approximate)   SpO2 98%   BMI 32.72 kg/m²  Estimated body mass index is 32.72 kg/m² as calculated from the following:    Height as of this encounter: 5' 1.5\" (1.562 m).    Weight as of this encounter: 176 lb (79.8 kg).   Vital signs reviewed.Appears stated age, well groomed.  GENERAL: well developed, well nourished, well hydrated, no distress, slightly congested with a nasal tone to voice coughs on occasion no respiratory distress no conversational dyspnea or audible wheeze  SKIN: good skin turgor, no obvious rashes  HEENT: atraumatic, normocephalic, ears, nose and throat are clear  EYES: sclera non icteric bilateral  NECK: supple, no adenopathy, no thyromegaly  LUNGS: clear to auscultation, no RR, occasional end expiratory wheeze noted to the bases but no rhonchi no crackles  CARDIO: RRR without murmur    ASSESSMENT AND PLAN:         1. Bronchitis  - XR CHEST PA + LAT CHEST (CPT=71046); Future  - CBC With Differential With Platelet; Future  - SARS-CoV-2/Flu A and B/RSV by PCR ([facility]) [E]; Future  - albuterol 108 (90 Base) MCG/ACT Inhalation Aero Soln; Inhale 1-2 puffs into the lungs every 4 (four) hours as needed for Wheezing or Shortness of Breath (cough).  Dispense: 1 each; Refill: 1  Pt requests CBC      PATIENT INSTRUCTIONS:    Have Flu test done  Chest Xray  Use albuterol inhaler as needed  Call if not improving , may  need Abx   To ER if with any worsening of Sx.  Use Cheratussin with codeine as needed               [1] No Known Allergies  [2]   Current Outpatient Medications on File Prior to Visit   Medication Sig Dispense Refill    clobetasol 0.05 % External Ointment Apply 1 Application topically 2 (two) times daily.      Estradiol 10 MCG Vaginal Tab 1 PV at bedtime x 14 nights, then twice weekly thereafter      hydrocortisone 25 MG Rectal Suppos 1 PV qhs      Levonorgest-Eth Estrad 91-Day 0.15-0.03 &0.01 MG Oral Tab Take one tab by mouth daily 91 tablet 4     No current facility-administered medications on file prior to visit.

## 2025-01-11 ENCOUNTER — PATIENT MESSAGE (OUTPATIENT)
Dept: FAMILY MEDICINE CLINIC | Facility: CLINIC | Age: 50
End: 2025-01-11

## 2025-01-11 LAB
FLUAV + FLUBV RNA SPEC NAA+PROBE: DETECTED
FLUAV + FLUBV RNA SPEC NAA+PROBE: NOT DETECTED
RSV RNA SPEC NAA+PROBE: NOT DETECTED
SARS-COV-2 RNA RESP QL NAA+PROBE: NOT DETECTED

## 2025-01-13 ENCOUNTER — PATIENT MESSAGE (OUTPATIENT)
Dept: FAMILY MEDICINE CLINIC | Facility: CLINIC | Age: 50
End: 2025-01-13

## 2025-01-15 NOTE — TELEPHONE ENCOUNTER
Patient requesting zofran for nausea    Please advise    LOV 1/10/25    . Bronchitis  - XR CHEST PA + LAT CHEST (CPT=71046); Future  - CBC With Differential With Platelet; Future  - SARS-CoV-2/Flu A and B/RSV by PCR (Leanne) [E]; Future  - albuterol 108 (90 Base) MCG/ACT Inhalation Aero Soln; Inhale 1-2 puffs into the lungs every 4 (four) hours as needed for Wheezing or Shortness of Breath (cough).  Dispense: 1 each; Refill: 1  Pt requests CBC    PATIENT INSTRUCTIONS:     Have Flu test done  Chest Xray  Use albuterol inhaler as needed  Call if not improving , may need Abx   To ER if with any worsening of Sx.  Use Cheratussin with codeine as needed

## 2025-01-19 RX ORDER — ONDANSETRON 4 MG/1
TABLET, ORALLY DISINTEGRATING ORAL EVERY 8 HOURS PRN
Qty: 30 TABLET | Refills: 1 | Status: SHIPPED | OUTPATIENT
Start: 2025-01-19 | End: 2025-01-26

## 2025-04-19 ENCOUNTER — HOSPITAL ENCOUNTER (EMERGENCY)
Age: 50
Discharge: HOME | End: 2025-04-19
Payer: COMMERCIAL

## 2025-04-19 VITALS
TEMPERATURE: 97 F | BODY MASS INDEX: 30.21 KG/M2 | OXYGEN SATURATION: 95 % | DIASTOLIC BLOOD PRESSURE: 83 MMHG | RESPIRATION RATE: 20 BRPM | HEART RATE: 118 BPM | WEIGHT: 160 LBS | SYSTOLIC BLOOD PRESSURE: 151 MMHG | HEIGHT: 61 IN

## 2025-04-19 DIAGNOSIS — S61.211A LACERATION OF LEFT INDEX FINGER WITHOUT FOREIGN BODY WITHOUT DAMAGE TO NAIL, INITIAL ENCOUNTER: Primary | ICD-10-CM

## 2025-04-19 PROCEDURE — 12001 RPR S/N/AX/GEN/TRNK 2.5CM/<: CPT

## 2025-04-19 PROCEDURE — 99283 EMERGENCY DEPT VISIT LOW MDM: CPT

## 2025-04-19 NOTE — ED PROVIDER NOTES
Patient Seen in: [facility] In [place]      History     Chief Complaint   Patient presents with    Arm or Hand Injury    Laceration/Abrasion     Stated Complaint: left index finger laceration around 1700    Subjective:   HPI    Pleasant 49-year-old female.  Right-hand-dominant.  Patient was opening a aluminum water bottle. As she opened the lid, the edge caused a laceration to the distal pad of the left index finger    Her tetanus is up-to-date.    History of Present Illness               Objective:     Past Medical History:    Body piercing    Ears    Endometriosis    Hemorrhoids    After pregnancy    Wears glasses    Readers              Past Surgical History:   Procedure Laterality Date      ,     Endometrial ablation  2006    Tonsillectomy                  Social History     Socioeconomic History    Marital status:    Tobacco Use    Smoking status: Never     Passive exposure: Never    Smokeless tobacco: Never   Vaping Use    Vaping status: Never Used   Substance and Sexual Activity    Alcohol use: Not Currently    Drug use: No    Sexual activity: Yes     Partners: Male     Birth control/protection: Pill, Vasectomy   Other Topics Concern    Caffeine Concern Yes    Exercise No    Seat Belt Yes    Special Diet No    Stress Concern No    Weight Concern Yes     Social Drivers of Health     Food Insecurity: No Food Insecurity (1/10/2025)    NCSS - Food Insecurity     Worried About Running Out of Food in the Last Year: No     Ran Out of Food in the Last Year: No   Transportation Needs: No Transportation Needs (1/10/2025)    NCSS - Transportation     Lack of Transportation: No   Housing Stability: Not At Risk (1/10/2025)    NCSS - Housing/Utilities     Has Housing: Yes     Worried About Losing Housing: No     Unable to Get Utilities: No                                Physical Exam     ED Triage Vitals [25 1739]   /83   Pulse 118   Resp 20   Temp 97.3 °F (36.3  °C)   Temp src Temporal   SpO2 95 %   O2 Device None (Room air)       Current Vitals:   Vital Signs  BP: 151/83  Pulse: 118  Resp: 20  Temp: 97.3 °F (36.3 °C)  Temp src: Temporal    Oxygen Therapy  SpO2: 95 %  O2 Device: None (Room air)        Physical Exam     Physical Exam            Gen: Well appearing, well groomed, alert and aware x 3  Lung: No distress, RR, no retraction  Extremities: Full ROM, no deformity, NVI  Skin: 1 cm laceration to the distal pad of left index finger.  Minimal depth.  Normal cap refill.  Neuro:  Normal Gait    ED Course   Labs Reviewed - No data to display       Results                              MDM        1 cm actively bleeding laceration to the distal pad of the left index finger.  Minimal depth.  Site thoroughly irrigated.    Verbal consent for the following procedure was obtained.  We discussed the inherent risks of procedure.  We discussed benefits.  Patient agrees to proceed with the procedure and verbalizes understanding of potential complications.    Sterile drape initiated.  Site sterilized.  Using 1% lidocaine with epinephrine, local injection performed.  Using 5-0 nylon, 2 simple interrupted sutures were placed.  Observed until hemostasis was achieved.    Wound care instructions detailed.  Suture removal in 5 days      Medical Decision Making      Disposition and Plan     Clinical Impression:  1. Laceration of left index finger without foreign body without damage to nail, initial encounter         Disposition:  Discharge  4/19/2025  6:01 pm    Follow-up:  No follow-up provider specified.        Medications Prescribed:  Current Discharge Medication List          Supplementary Documentation:

## 2025-06-02 ENCOUNTER — LAB ENCOUNTER (OUTPATIENT)
Dept: LAB | Age: 50
End: 2025-06-02
Attending: EMERGENCY MEDICINE
Payer: COMMERCIAL

## 2025-06-02 DIAGNOSIS — E78.00 HYPERCHOLESTEROLEMIA: ICD-10-CM

## 2025-06-02 LAB
CHOLEST SERPL-MCNC: 210 MG/DL (ref ?–200)
FASTING PATIENT LIPID ANSWER: YES
HDLC SERPL-MCNC: 49 MG/DL (ref 40–59)
LDLC SERPL CALC-MCNC: 140 MG/DL (ref ?–100)
NONHDLC SERPL-MCNC: 161 MG/DL (ref ?–130)
TRIGL SERPL-MCNC: 116 MG/DL (ref 30–149)
VLDLC SERPL CALC-MCNC: 21 MG/DL (ref 0–30)

## 2025-06-02 PROCEDURE — 80061 LIPID PANEL: CPT | Performed by: EMERGENCY MEDICINE

## (undated) NOTE — LETTER
Betty Bui  [address] [place] [address]      [id number]      October 12, 2020    Dear Ms. Csah,    I have recently reviewed your mammogram report from radiology.  I am happy to see that the report was read as normal.    Our radiology dep

## (undated) NOTE — LETTER
October 12, 2020    [address] [facility]      Dear Rosy Portillo: This letter is to inform you that your pap smear was normal and your High Risk HPV test was negative.  Therefore, you need to repeat your pap smear in

## (undated) NOTE — LETTER
[address] [facility]        [id number]      February 23, 2018        Dear Ms. Stella Richard,    We recently received the report from your recent breast imaging.  Your mammogram was read as benign/normal.     Breast cance